# Patient Record
Sex: FEMALE | Race: WHITE | URBAN - METROPOLITAN AREA
[De-identification: names, ages, dates, MRNs, and addresses within clinical notes are randomized per-mention and may not be internally consistent; named-entity substitution may affect disease eponyms.]

---

## 2018-11-15 ENCOUNTER — OFFICE VISIT (OUTPATIENT)
Dept: NEUROLOGY | Age: 63
End: 2018-11-15

## 2018-11-15 VITALS
HEART RATE: 85 BPM | SYSTOLIC BLOOD PRESSURE: 158 MMHG | HEIGHT: 66 IN | DIASTOLIC BLOOD PRESSURE: 80 MMHG | OXYGEN SATURATION: 98 % | BODY MASS INDEX: 28.73 KG/M2 | WEIGHT: 178.8 LBS

## 2018-11-15 DIAGNOSIS — G60.9 IDIOPATHIC PERIPHERAL NEUROPATHY: Primary | ICD-10-CM

## 2018-11-15 DIAGNOSIS — R29.898 WEAKNESS OF BOTH ARMS: ICD-10-CM

## 2018-11-15 DIAGNOSIS — R53.83 FATIGUE, UNSPECIFIED TYPE: ICD-10-CM

## 2018-11-15 RX ORDER — ASPIRIN 81 MG/1
TABLET ORAL DAILY
COMMUNITY

## 2018-11-15 RX ORDER — TRIAMTERENE/HYDROCHLOROTHIAZID 37.5-25 MG
TABLET ORAL DAILY
COMMUNITY

## 2018-11-15 RX ORDER — NAPROXEN 500 MG/1
500 TABLET ORAL 2 TIMES DAILY WITH MEALS
COMMUNITY

## 2018-11-15 RX ORDER — TAMOXIFEN CITRATE 20 MG/1
20 TABLET ORAL DAILY
COMMUNITY

## 2018-11-15 NOTE — PROGRESS NOTES
NEUROLOGY CLINIC NOTE    Patient ID:  Amy Pyo  [id number]  14 y.o.  1955    Date of Consultation:  November 15, 2018    Referring Physician: Dr. Dane Gupta    Reason for Consultation:  Weakness and paresthesia    Chief Complaint   Patient presents with    New Patient     Pain & Weakness in shoulder       History of Present Illness: There are no active problems to display for this patient. Past Medical History:   Diagnosis Date    Breast cancer ([address]     Essential hypertension     Hyperlipidemia     Insomnia     Neurological disorder     Osteopenia     RLS (restless legs syndrome)     Vitamin D deficiency      Past Surgical History:   Procedure Laterality Date    HX GI      rectal surgery    HX MASTECTOMY Right     HX ORTHOPAEDIC      left knee surgery    HX UROLOGICAL      bladder surgery        Prior to Admission medications    Medication Sig Start Date End Date Taking? Authorizing Provider   triamterene-hydroCHLOROthiazide (MAXZIDE) 37.5-25 mg per tablet Take  by mouth daily. Yes Provider, Historical   zinc 50 mg tab tablet Take  by mouth daily. Yes Provider, Historical   tamoxifen (NOLVADEX) 20 mg tablet Take 20 mg by mouth daily. Yes Provider, Historical   aspirin delayed-release 81 mg tablet Take  by mouth daily. Yes Provider, Historical   naproxen (NAPROSYN) 500 mg tablet Take 500 mg by mouth two (2) times daily (with meals). Yes Provider, Historical   TURMERIC PO Take  by mouth. Yes Provider, Historical   multivit-min/iron/folic/lutein (CENTRUM SILVER WOMEN PO) Take  by mouth. Yes Provider, Historical   Lactobacillus rhamnosus GG (CULTURELLE PO) Take  by mouth. Yes Provider, Historical   calcium carbonate (CALCIUM 600 PO) Take  by mouth. Yes Provider, Historical   calcium carbonate/vitamin D3 (CALCIUM 600 + D,3, PO) Take  by mouth.     Provider, Historical     Allergies   Allergen Reactions    Other Medication Itching     TETANUS     Social History Tobacco Use    Smoking status: Former Smoker    Smokeless tobacco: Never Used   Substance Use Topics    Alcohol use: Yes     Alcohol/week: 3.0 oz     Types: 5 Cans of beer per week    Drug use: No      Family History   Problem Relation Age of Onset    Cancer Father     Asthma Mother        Subjective:      Patti Lamas is a 66 y.o. TJJL with history of right breast cancer s/p mastectomy and chemotherapy, migraine, RLS, hyperlipidemia, HTN, insomnia, vitamin D deficiency and osteopenia who was referred here by Dr. Marc Duverney for further evaluation of her neuropathy. History of breast cancer 2011 s/p mastectomy and 6 months of chemotherapy. Seen by Oncologist last 10/29/2018 for arms getting weak. Unable to hold her arms above her head for very long without fatigue. Stopped Gabapentin out of concern for side effects. Drops objects more easily. Worsening numbness and tingling. Mild neck pain. Referred here for further evaluation. Complains of widespread pain. Started in her feet (mainly toes and soles). Burning, tingling and numbness. Constant since the chemotherapy. 3/10 and at its worst 7/10. Problems going up and down stairs. She has to hold on but for the most part no issues. No weakness and loss of muscle bulk. Painful to walk due to feet pain. Gabapentin was too sedating. Takes Zinc supplement. Mentions some restlessness. Pain in shoulder and neck for years. Worse the past several months. Mainly lifting anything above the shoulder both at the onset and when sustaining it. Dropping things. Sometimes hands are numb upon waking up in the morning and feels swollen. No loss of muscle bulk. Labs done 10/18/2018 were unremarkable (CBC, CMP and vitamin D levels). Outside reports reviewed: office notes, lab reports.     Review of Systems:    A comprehensive review of systems was performed:   Constitutional: positive for fatigue   Eyes: positive for none  Ears, nose, mouth, throat, and face: positive for none  Respiratory: positive for none  Cardiovascular: positive for leg swelling   Gastrointestinal: positive for none  Genitourinary: positive for incontinence   Integument/breast: positive for none  Hematologic/lymphatic: positive for none  Musculoskeletal: positive for joint pain, myalgia, weakness  Neurological: positive for neuropathy   Behavioral/Psych: positive for none  Endocrine: positive for none  Allergic/Immunologic: positive for none      Objective:     Visit Vitals  /80 Comment: Patient states she has not taken BP meds. Pulse 85   Ht 5' 6\" (1.676 m)   Wt 178 lb 12.8 oz (81.1 kg)   SpO2 98%   BMI 28.86 kg/m²     4/10 generalized pain    PHYSICAL EXAM:    General Appearance: Alert, patient appears stated age. General:  Well developed, well nourished, patient in no apparent distress. Head/Face: The head is normocephalic and atraumatic. Eyes: Conjunctivae appear normal. Sclera appear normal and non-icteric. Nose (and Sinus):   No abnormality of the nose or sinuses is noted. Oral:   Throat is clear. Lymphatics:  No lymphadenopathy in the neck/head. Neck and Thyroid:   No bruits noted in the neck. Respiratory:  Lungs clear to auscultation. Cardiovascular:  Palpation and auscultation: regular rate and rhythm. Extremity: No joint swelling, erythema or pedal edema. NEUROLOGICAL EXAM:    Appearance: The patient is well developed, well nourished, provides a coherent history and is in no acute distress. Mental Status: Oriented to time, place and person. Fluent, no aphasia or dysarthria. Mood and affect appropriate. Cranial Nerves:   Intact visual fields. ALLISON, EOM's full, no nystagmus. Mild ptosis on 2 minutes extreme upgaze. Facial sensation is normal. Corneal reflexes are intact. Facial movement is symmetric. Hearing is normal bilaterally. Palate is midline with normal elevation. Sternocleidomastoid and trapezius muscles are normal. Tongue is midline.    Motor: 5/5 strength in upper and lower proximal and distal muscles. Normal bulk and tone. No fasciculations. No pronator drift. Reflexes:   Deep tendon reflexes 1+/4 and symmetrical. Downgoing toes. Sensory:   Normal to cold, pinprick and vibration. Gait:  Normal gait. No Romberg. Can do tandem walking. Tremor:   No tremor noted. Cerebellar:  Intact FTN/YAHAIRA/HTS. Neurovascular:  Normal heart sounds and regular rhythm, peripheral pulses intact, and no carotid bruits. Labs Reviewed      Assessment:   Peripheral neuropathy  Weakness of both arms  Fatigue    Plan:   Neurological examination does not reveal any clear glove and stocking sensory deficit typically seen in peripheral neuropathy. However given the patient's history of chemotherapy, she is likely having small fiber neuropathy which is typical.  Is mainly because of pain and discomfort but no issues associated with large fiber involvement which is ataxia and wobbliness. Check TSH and B12 and folate levels. Discussed with the patient potential treatment options to help with the neuropathic discomforts pending results of the testing. Exam does not reveal any actual muscle weakness. However issue with weakness plus paresthesia is concerning for emerging peripheral neuropathy or myopathy. EMG/NCS of the upper extremity was ordered. Further intervention will be done pending results of the testing. Exam reveals mild ptosis on extreme upgaze. Patient has symptoms of fatigability by history. Assess myasthenia panel laboratory workup. Again further intervention be done pending results of testing. All questions and concerns were answered. Visit lasted 60 minutes.   Greater than 50% was spent discussing her condition, potential etiology, prognosis, review of office records and laboratory workup, discussion about the need for further diagnostic testing, discussion about need for EMG/NCS to assess for peripheral neuropathy or myopathy, laboratory workup to assess for conditions that can cause neuropathy, testing for neuromuscular junction disorders. All questions and concerns were answered.

## 2018-11-15 NOTE — PATIENT INSTRUCTIONS
Office Policies  o Phone calls/patient messages:  Please allow up to 24 hours for someone in the office to contact you about your call or message. Be mindful your provider may be out of the office or your message may require further review. We encourage you to use [employer] for your messages as this is a faster, more efficient way to communicate with our office  o Medication Refills:  Prescription medications require up to 48 business hours to process. We encourage you to use [employer]rooms for your refills. For controlled medications: Please allow up to 72 business hours to process. Certain medications may require you to  a written prescription at our office. NO narcotic/controlled medications will be prescribed after 4pm Monday through Friday or on weekends  o Form/Paperwork Completion:  Please note there is a $25 fee for all paperwork completed by our providers. We ask that you allow 7-14 business days. Pre-payment is due prior to picking up/faxing the completed form. You may also download your forms to [employer]rooms to have your doctor print off. A Healthy Lifestyle: Care Instructions  Your Care Instructions    A healthy lifestyle can help you feel good, stay at a healthy weight, and have plenty of energy for both work and play. A healthy lifestyle is something you can share with your whole family. A healthy lifestyle also can lower your risk for serious health problems, such as high blood pressure, heart disease, and diabetes. You can follow a few steps listed below to improve your health and the health of your family. Follow-up care is a key part of your treatment and safety. Be sure to make and go to all appointments, and call your doctor if you are having problems. It's also a good idea to know your test results and keep a list of the medicines you take. How can you care for yourself at home? · Do not eat too much sugar, fat, or fast foods. You can still have dessert and treats now and then.  The goal is moderation. · Start small to improve your eating habits. Pay attention to portion sizes, drink less juice and soda pop, and eat more fruits and vegetables. ? Eat a healthy amount of food. A 3-ounce serving of meat, for example, is about the size of a deck of cards. Fill the rest of your plate with vegetables and whole grains. ? Limit the amount of soda and sports drinks you have every day. Drink more water when you are thirsty. ? Eat at least 5 servings of fruits and vegetables every day. It may seem like a lot, but it is not hard to reach this goal. A serving or helping is 1 piece of fruit, 1 cup of vegetables, or 2 cups of leafy, raw vegetables. Have an apple or some carrot sticks as an afternoon snack instead of a candy bar. Try to have fruits and/or vegetables at every meal.  · Make exercise part of your daily routine. You may want to start with simple activities, such as walking, bicycling, or slow swimming. Try to be active 30 to 60 minutes every day. You do not need to do all 30 to 60 minutes all at once. For example, you can exercise 3 times a day for 10 or 20 minutes. Moderate exercise is safe for most people, but it is always a good idea to talk to your doctor before starting an exercise program.  · Keep moving. D[place] the lawn, work in the garden, or [employer]. Take the stairs instead of the elevator at work. · If you smoke, quit. People who smoke have an increased risk for heart attack, stroke, cancer, and other lung illnesses. Quitting is hard, but there are ways to boost your chance of quitting tobacco for good. ? Use nicotine gum, patches, or lozenges. ? Ask your doctor about stop-smoking programs and medicines. ? Keep trying. In addition to reducing your risk of diseases in the future, you will notice some benefits soon after you stop using tobacco. If you have shortness of breath or asthma symptoms, they will likely get better within a few weeks after you quit.   · Limit how much alcohol you drink. Moderate amounts of alcohol (up to 2 drinks a day for men, 1 drink a day for women) are okay. But drinking too much can lead to liver problems, high blood pressure, and other health problems. Family health  If you have a family, there are many things you can do together to improve your health. · Eat meals together as a family as often as possible. · Eat healthy foods. This includes fruits, vegetables, lean meats and dairy, and whole grains. · Include your family in your fitness plan. Most people think of activities such as jogging or tennis as the way to fitness, but there are many ways you and your family can be more active. Anything that makes you breathe hard and gets your heart pumping is exercise. Here are some tips:  ? Walk to do errands or to take your child to school or the bus.  ? Go for a family bike ride after dinner instead of watching TV. Where can you learn more? Go to http://sugeyLiveOnDemandkeenan.info/. Enter W095 in the search box to learn more about \"A Healthy Lifestyle: Care Instructions. \"  Current as of: December 7, 2017  Content Version: 11.8  © 3571-9446 [employer]. Care instructions adapted under license by [employer] (which disclaims liability or warranty for this information). If you have questions about a medical condition or this instruction, always ask your healthcare professional. [address] any warranty or liability for your use of this information. Neuropathic Pain: Care Instructions  Your Care Instructions    Neuropathic pain is caused by pressure on or damage to your nerves. It's often simply called nerve pain. Some people feel this type of pain all the time. For others, it comes and goes. Diabetes, shingles, or an injury can cause nerve pain. Many people say the pain feels sharp, burning, or stabbing. But some people feel it as a dull ache.  In some cases, it makes your skin very sensitive. So touch, pressure, and other sensations that did not hurt before may now cause pain. It's important to know that this kind of pain is real and can affect your quality of life. It's also important to know that treatment can help. Treatment includes pain medicines, exercise, and physical therapy. Medicines can help reduce the number of pain signals that travel over the nerves. This can make the painful areas less sensitive. It can also help you sleep better and improve your mood. But medicines are only one part of successful treatment. Most people do best with more than one kind of treatment. Your doctor may recommend that you try cognitive-behavioral therapy and stress management. Or, if needed, you may decide to try to quit smoking, lower your blood pressure, or better control blood sugar. These kinds of healthy changes can also make a difference. If you feel that your treatment is not working, talk to your doctor. And be sure to tell your doctor if you think you might be depressed or anxious. These are common problems that can also be treated. Follow-up care is a key part of your treatment and safety. Be sure to make and go to all appointments, and call your doctor if you are having problems. It's also a good idea to know your test results and keep a list of the medicines you take. How can you care for yourself at home? · Be safe with medicines. Read and follow all instructions on the label. ? If the doctor gave you a prescription medicine for pain, take it as prescribed. ? If you are not taking a prescription pain medicine, ask your doctor if you can take an over-the-counter medicine. · Save hard tasks for days when you have less pain. Follow a hard task with an easy task. And remember to take breaks. · Relax, and reduce stress. You may want to try deep breathing or meditation. These can help. · Keep moving. Gentle, daily exercise can help reduce pain.  Your doctor or physical therapist can tell you what type of exercise is best for you. This may include walking, swimming, and stationary biking. It may also include stretches and range-of-motion exercises. · Try heat, cold packs, and massage. · Get enough sleep. Constant pain can make you more tired. If the pain makes it hard to sleep, talk with your doctor. · Think positively. Your thoughts can affect your pain. Do fun things to distract yourself from the pain. See a movie, read a book, listen to music, or spend time with a friend. · Keep a pain diary. Try to write down how strong your pain is and what it feels like. Also try to notice and write down how your moods, thoughts, sleep, activities, and medicine affect your pain. These notes can help you and your doctor find the best ways to treat your pain. Reducing constipation caused by pain medicine  Pain medicines often cause constipation. To reduce constipation:  · Include fruits, vegetables, beans, and whole grains in your diet each day. These foods are high in fiber. · Drink plenty of fluids, enough so that your urine is light yellow or clear like water. If you have kidney, heart, or liver disease and have to limit fluids, talk with your doctor before you increase the amount of fluids you drink. · Get some exercise every day. Build up slowly to 30 to 60 minutes a day on 5 or more days of the week. · Take a fiber supplement, such as Citrucel or Metamucil, every day if needed. Read and follow all instructions on the label. · Schedule time each day for a bowel movement. Having a daily routine may help. Take your time and do not strain when having a bowel movement. · Ask your doctor about a laxative. The goal is to have one easy bowel movement every 1 to 2 days. Do not let constipation go untreated for more than 3 days. When should you call for help? Call your doctor now or seek immediate medical care if:    · You feel sad, anxious, or hopeless for more than a few days.  This could mean you are depressed. Depression is common in people who have a lot of pain. But it can be treated.     · You have trouble with bowel movements, such as:  ? No bowel movement in 3 days. ? Blood in the anal area, in your stool, or on the toilet paper. ? Diarrhea for more than 24 hours.    Watch closely for changes in your health, and be sure to contact your doctor if:    · Your pain is getting worse.     · You can't sleep because of pain.     · You are very worried or anxious about your pain.     · You have trouble taking your pain medicine.     · You have any concerns about your pain medicine or its side effects.     · You have vomiting or cramps for more than 2 hours. Where can you learn more? Go to http://sugey-keenan.info/. Enter I894 in the search box to learn more about \"Neuropathic Pain: Care Instructions. \"  Current as of: June 4, 2018  Content Version: 11.8  © 9443-3040 [employer], [employer]. Care instructions adapted under license by [employer] (which disclaims liability or warranty for this information). If you have questions about a medical condition or this instruction, always ask your healthcare professional. [address] any warranty or liability for your use of this information.

## 2018-11-27 ENCOUNTER — OFFICE VISIT (OUTPATIENT)
Dept: NEUROLOGY | Age: 63
End: 2018-11-27

## 2018-11-27 VITALS
DIASTOLIC BLOOD PRESSURE: 89 MMHG | BODY MASS INDEX: 28.77 KG/M2 | HEIGHT: 66 IN | WEIGHT: 179 LBS | SYSTOLIC BLOOD PRESSURE: 151 MMHG | HEART RATE: 85 BPM | OXYGEN SATURATION: 98 %

## 2018-11-27 DIAGNOSIS — R29.898 WEAKNESS OF BOTH ARMS: Primary | ICD-10-CM

## 2018-11-27 DIAGNOSIS — R53.83 FATIGUE, UNSPECIFIED TYPE: ICD-10-CM

## 2018-11-27 NOTE — PATIENT INSTRUCTIONS
A Healthy Lifestyle: Care Instructions  Your Care Instructions    A healthy lifestyle can help you feel good, stay at a healthy weight, and have plenty of energy for both work and play. A healthy lifestyle is something you can share with your whole family. A healthy lifestyle also can lower your risk for serious health problems, such as high blood pressure, heart disease, and diabetes. You can follow a few steps listed below to improve your health and the health of your family. Follow-up care is a key part of your treatment and safety. Be sure to make and go to all appointments, and call your doctor if you are having problems. It's also a good idea to know your test results and keep a list of the medicines you take. How can you care for yourself at home? · Do not eat too much sugar, fat, or fast foods. You can still have dessert and treats now and then. The goal is moderation. · Start small to improve your eating habits. Pay attention to portion sizes, drink less juice and soda pop, and eat more fruits and vegetables. ? Eat a healthy amount of food. A 3-ounce serving of meat, for example, is about the size of a deck of cards. Fill the rest of your plate with vegetables and whole grains. ? Limit the amount of soda and sports drinks you have every day. Drink more water when you are thirsty. ? Eat at least 5 servings of fruits and vegetables every day. It may seem like a lot, but it is not hard to reach this goal. A serving or helping is 1 piece of fruit, 1 cup of vegetables, or 2 cups of leafy, raw vegetables. Have an apple or some carrot sticks as an afternoon snack instead of a candy bar. Try to have fruits and/or vegetables at every meal.  · Make exercise part of your daily routine. You may want to start with simple activities, such as walking, bicycling, or slow swimming. Try to be active 30 to 60 minutes every day. You do not need to do all 30 to 60 minutes all at once.  For example, you can exercise 3 times a day for 10 or 20 minutes. Moderate exercise is safe for most people, but it is always a good idea to talk to your doctor before starting an exercise program.  · Keep moving. Dao [place] the lawn, work in the garden, or [employer]. Take the stairs instead of the elevator at work. · If you smoke, quit. People who smoke have an increased risk for heart attack, stroke, cancer, and other lung illnesses. Quitting is hard, but there are ways to boost your chance of quitting tobacco for good. ? Use nicotine gum, patches, or lozenges. ? Ask your doctor about stop-smoking programs and medicines. ? Keep trying. In addition to reducing your risk of diseases in the future, you will notice some benefits soon after you stop using tobacco. If you have shortness of breath or asthma symptoms, they will likely get better within a few weeks after you quit. · Limit how much alcohol you drink. Moderate amounts of alcohol (up to 2 drinks a day for men, 1 drink a day for women) are okay. But drinking too much can lead to liver problems, high blood pressure, and other health problems. Family health  If you have a family, there are many things you can do together to improve your health. · Eat meals together as a family as often as possible. · Eat healthy foods. This includes fruits, vegetables, lean meats and dairy, and whole grains. · Include your family in your fitness plan. Most people think of activities such as jogging or tennis as the way to fitness, but there are many ways you and your family can be more active. Anything that makes you breathe hard and gets your heart pumping is exercise. Here are some tips:  ? Walk to do errands or to take your child to school or the bus.  ? Go for a family bike ride after dinner instead of watching TV. Where can you learn more? Go to http://sugey-keenan.info/. Enter S102 in the search box to learn more about \"A Healthy Lifestyle: Care Instructions. \"  Current as of: December 7, 2017  Content Version: 11.8  © 3430-9036 [employer]. Care instructions adapted under license by [employer] (which disclaims liability or warranty for this information). If you have questions about a medical condition or this instruction, always ask your healthcare professional. [address] any warranty or liability for your use of this information.

## 2018-12-05 NOTE — PROCEDURES
[facility] at [facility]        Tel: [phone] ? Fax: [phone]    ELECTRODIAGNOSTIC REPORT      Test Date:  2018    Patient: Meta Boeck : 1955 Physician: Karol Henriquez   ID#: [id number] SEX: Female Ref. Phys: Karol Henriquez     Patient History / Exam:  Proximal UE weakness and hand numbness. EMG & NCV Findings:  Sensory and motor nerve conduction studies (as indicated in the tables) were within reference of normal.    Disposable concentric needle EMG (as indicated in the following table) showed no evidence of electrical instability. EMG was not done on the RUE due to previous radical mastectomy. Impression: This study is normal. There is no electrodiagnostic evidence of an entrapment neuropathy, generalized large fiber neuropathy, myopathy or significant cervical radiculopathy at this time.       Karol Henriquez MD    Nerve Conduction Studies  Anti Sensory Summary Table     Stim Site NR Peak (ms) Norm Peak (ms) P-T Amp (µV) Norm P-T Amp Site1 Site2 Dist (cm)   Left Median Anti Sensory (2nd Digit)   Wrist    3.7 <4 29.8 >13 Wrist 2nd Digit 14.0   Right Median Anti Sensory (2nd Digit)   Wrist    3.6 <4 31.2 >13 Wrist 2nd Digit 14.0   Left Radial Anti Sensory (Base 1st Digit)   Wrist    2.1 <2.8 32.1 >11 Wrist Base 1st Digit 10.0   Right Radial Anti Sensory (Base 1st Digit)   Wrist    2.0 <2.8 24.1 >11 Wrist Base 1st Digit 10.0   Left Ulnar Anti Sensory (5th Digit)   Wrist    3.2 <4.0 38.7 >9 Wrist 5th Digit 14.0   Right Ulnar Anti Sensory (5th Digit)   Wrist    3.2 <4.0 24.9 >9 Wrist 5th Digit 14.0     Motor Summary Table     Stim Site NR Onset (ms) Norm Onset (ms) O-P Amp (mV) Norm O-P Amp P-T Amp (mV) Site1 Site2 Dist (cm) Juan (m/s)   Left Median Motor (Abd Poll Brev)   Wrist    3.8 <4.5 6.0 >4.1 9.3 Wrist Abd Poll Brev 8.0    Elbow    7.3  5.8  8.7 Elbow Wrist 21.0 60   Right Median Motor (Abd Poll Brev)   Wrist    4.1 <4.5 8.4 >4.1 12.7 Wrist Abd [place] Brev 8.0    Elbow    8.2  8.1  11.9 Elbow Wrist 22.0 54   Left Ulnar Motor (Abd Dig Minimi)   Wrist    2.3 <3.1 7.1 >7.0 11.3 Wrist Abd Dig Minimi 8.0    B Elbow    5.7  6.6  11.0 B Elbow Wrist 21.0 62   A Elbow    7.3  6.6  10.9 A Elbow B Elbow 10.0 63   Right Ulnar Motor (Abd Dig Minimi)   Wrist    2.3 <3.1 8.6 >7.0 13.1 Wrist Abd Dig Minimi 8.0    B Elbow    5.9  8.2  12.5 B Elbow Wrist 22.0 61   A Elbow    7.4  7.6  11.9 A Elbow B Elbow 10.0 67       EMG     Side Muscle Nerve Root Ins Act Fibs Psw Recrt Duration Amp Poly Comment   Left Deltoid Axillary C5-6 Nml Nml Nml Nml Nml Nml Nml    Left Triceps Radial C6-7-8 Nml Nml Nml Nml Nml Nml Nml    Left Biceps Musculocut C5-6 Nml Nml Nml Nml Nml Nml Nml    Left PronatorTeres Median C6-7 Nml Nml Nml Nml Nml Nml Nml    Left 1stDorInt Ulnar C8-T1 Nml Nml Nml Nml Nml Nml Nml    Left Lower Cerv Parasp Rami C7,T1 Nml Nml Nml Nml Nml Nml Nml    Left Mid Cerv Parasp Rami C5,6 Nml Nml Nml Nml Nml Nml Nml    Left Upper Cerv Parasp Rami C3,4 Nml Nml Nml Nml Nml Nml Nml      Waveforms:

## 2023-11-10 ENCOUNTER — APPOINTMENT (OUTPATIENT)
Facility: HOSPITAL | Age: 68
DRG: 418 | End: 2023-11-10
Payer: MEDICARE

## 2023-11-10 ENCOUNTER — ANESTHESIA (OUTPATIENT)
Facility: HOSPITAL | Age: 68
End: 2023-11-10
Payer: MEDICARE

## 2023-11-10 ENCOUNTER — HOSPITAL ENCOUNTER (INPATIENT)
Facility: HOSPITAL | Age: 68
LOS: 2 days | Discharge: HOME | DRG: 418 | End: 2023-11-12
Attending: EMERGENCY MEDICINE | Admitting: STUDENT IN AN ORGANIZED HEALTH CARE EDUCATION/TRAINING PROGRAM
Payer: MEDICARE

## 2023-11-10 ENCOUNTER — ANESTHESIA EVENT (OUTPATIENT)
Facility: HOSPITAL | Age: 68
End: 2023-11-10
Payer: MEDICARE

## 2023-11-10 DIAGNOSIS — K81.9 CHOLECYSTITIS: Primary | ICD-10-CM

## 2023-11-10 PROBLEM — K80.00 CALCULUS OF GALLBLADDER WITH ACUTE CHOLECYSTITIS WITHOUT OBSTRUCTION: Status: ACTIVE | Noted: 2023-11-10

## 2023-11-10 LAB
ALBUMIN SERPL-MCNC: 3.1 G/DL (ref 3.5–5)
ALBUMIN/GLOB SERPL: 0.8 (ref 1.1–2.2)
ALP SERPL-CCNC: 89 U/L (ref 45–117)
ALT SERPL-CCNC: 30 U/L (ref 12–78)
ANION GAP BLD CALC-SCNC: 13 (ref 10–20)
ANION GAP SERPL CALC-SCNC: 6 MMOL/L (ref 5–15)
APPEARANCE UR: CLEAR
AST SERPL-CCNC: 30 U/L (ref 15–37)
BACTERIA URNS QL MICRO: NEGATIVE /HPF
BASE EXCESS BLD CALC-SCNC: 2.5 MMOL/L
BASOPHILS # BLD: 0 K/UL (ref 0–0.1)
BASOPHILS NFR BLD: 0 % (ref 0–1)
BILIRUB DIRECT SERPL-MCNC: 0.2 MG/DL (ref 0–0.2)
BILIRUB SERPL-MCNC: 0.8 MG/DL (ref 0.2–1)
BILIRUB UR QL: NEGATIVE
BUN SERPL-MCNC: 9 MG/DL (ref 6–20)
BUN/CREAT SERPL: 12 (ref 12–20)
CA-I BLD-MCNC: 1.17 MMOL/L (ref 1.12–1.32)
CALCIUM SERPL-MCNC: 8.4 MG/DL (ref 8.5–10.1)
CHLORIDE BLD-SCNC: 99 MMOL/L (ref 100–108)
CHLORIDE SERPL-SCNC: 106 MMOL/L (ref 97–108)
CO2 BLD-SCNC: 28 MMOL/L (ref 19–24)
CO2 SERPL-SCNC: 27 MMOL/L (ref 21–32)
COLOR UR: ABNORMAL
CREAT SERPL-MCNC: 0.73 MG/DL (ref 0.55–1.02)
CREAT UR-MCNC: 0.5 MG/DL (ref 0.6–1.3)
DIFFERENTIAL METHOD BLD: ABNORMAL
EOSINOPHIL # BLD: 0.1 K/UL (ref 0–0.4)
EOSINOPHIL NFR BLD: 1 % (ref 0–7)
EPITH CASTS URNS QL MICRO: ABNORMAL /LPF
ERYTHROCYTE [DISTWIDTH] IN BLOOD BY AUTOMATED COUNT: 13.2 % (ref 11.5–14.5)
GLOBULIN SER CALC-MCNC: 3.7 G/DL (ref 2–4)
GLUCOSE BLD STRIP.AUTO-MCNC: 104 MG/DL (ref 74–106)
GLUCOSE SERPL-MCNC: 100 MG/DL (ref 65–100)
GLUCOSE UR STRIP.AUTO-MCNC: NEGATIVE MG/DL
GRAN CASTS URNS QL MICRO: ABNORMAL /LPF
HCO3 BLDA-SCNC: 28 MMOL/L
HCT VFR BLD AUTO: 38.3 % (ref 35–47)
HGB BLD-MCNC: 12.9 G/DL (ref 11.5–16)
HGB UR QL STRIP: NEGATIVE
HYALINE CASTS URNS QL MICRO: ABNORMAL /LPF (ref 0–5)
IMM GRANULOCYTES # BLD AUTO: 0.1 K/UL (ref 0–0.04)
IMM GRANULOCYTES NFR BLD AUTO: 1 % (ref 0–0.5)
KETONES UR QL STRIP.AUTO: ABNORMAL MG/DL
LEUKOCYTE ESTERASE UR QL STRIP.AUTO: ABNORMAL
LYMPHOCYTES # BLD: 1.6 K/UL (ref 0.8–3.5)
LYMPHOCYTES NFR BLD: 18 % (ref 12–49)
MCH RBC QN AUTO: 31.2 PG (ref 26–34)
MCHC RBC AUTO-ENTMCNC: 33.7 G/DL (ref 30–36.5)
MCV RBC AUTO: 92.7 FL (ref 80–99)
MONOCYTES # BLD: 1 K/UL (ref 0–1)
MONOCYTES NFR BLD: 10 % (ref 5–13)
NEUTS SEG # BLD: 6.5 K/UL (ref 1.8–8)
NEUTS SEG NFR BLD: 70 % (ref 32–75)
NITRITE UR QL STRIP.AUTO: NEGATIVE
NRBC # BLD: 0 K/UL (ref 0–0.01)
NRBC BLD-RTO: 0 PER 100 WBC
PCO2 BLDV: 46.3 MMHG (ref 41–51)
PH BLDV: 7.39 (ref 7.32–7.42)
PH UR STRIP: 6 (ref 5–8)
PLATELET # BLD AUTO: 257 K/UL (ref 150–400)
PMV BLD AUTO: 9.2 FL (ref 8.9–12.9)
PO2 BLDV: <27 MMHG (ref 25–40)
POTASSIUM BLD-SCNC: 3.2 MMOL/L (ref 3.5–5.5)
POTASSIUM SERPL-SCNC: 3.2 MMOL/L (ref 3.5–5.1)
PROT SERPL-MCNC: 6.8 G/DL (ref 6.4–8.2)
PROT UR STRIP-MCNC: NEGATIVE MG/DL
RBC # BLD AUTO: 4.13 M/UL (ref 3.8–5.2)
RBC #/AREA URNS HPF: ABNORMAL /HPF (ref 0–5)
SODIUM BLD-SCNC: 140 MMOL/L (ref 136–145)
SODIUM SERPL-SCNC: 139 MMOL/L (ref 136–145)
SP GR UR REFRACTOMETRY: 1.01
SPECIMEN SITE: ABNORMAL
UROBILINOGEN UR QL STRIP.AUTO: 1 EU/DL (ref 0.2–1)
WBC # BLD AUTO: 9.2 K/UL (ref 3.6–11)
WBC URNS QL MICRO: ABNORMAL /HPF (ref 0–4)

## 2023-11-10 PROCEDURE — 85025 COMPLETE CBC W/AUTO DIFF WBC: CPT

## 2023-11-10 PROCEDURE — 2720000010 HC SURG SUPPLY STERILE: Performed by: STUDENT IN AN ORGANIZED HEALTH CARE EDUCATION/TRAINING PROGRAM

## 2023-11-10 PROCEDURE — 2709999900 HC NON-CHARGEABLE SUPPLY: Performed by: STUDENT IN AN ORGANIZED HEALTH CARE EDUCATION/TRAINING PROGRAM

## 2023-11-10 PROCEDURE — 84132 ASSAY OF SERUM POTASSIUM: CPT

## 2023-11-10 PROCEDURE — 81001 URINALYSIS AUTO W/SCOPE: CPT

## 2023-11-10 PROCEDURE — 3600000004 HC SURGERY LEVEL 4 BASE: Performed by: STUDENT IN AN ORGANIZED HEALTH CARE EDUCATION/TRAINING PROGRAM

## 2023-11-10 PROCEDURE — 3700000001 HC ADD 15 MINUTES (ANESTHESIA): Performed by: STUDENT IN AN ORGANIZED HEALTH CARE EDUCATION/TRAINING PROGRAM

## 2023-11-10 PROCEDURE — 6360000002 HC RX W HCPCS: Performed by: STUDENT IN AN ORGANIZED HEALTH CARE EDUCATION/TRAINING PROGRAM

## 2023-11-10 PROCEDURE — 7100000000 HC PACU RECOVERY - FIRST 15 MIN: Performed by: STUDENT IN AN ORGANIZED HEALTH CARE EDUCATION/TRAINING PROGRAM

## 2023-11-10 PROCEDURE — 74176 CT ABD & PELVIS W/O CONTRAST: CPT

## 2023-11-10 PROCEDURE — 2500000003 HC RX 250 WO HCPCS: Performed by: NURSE ANESTHETIST, CERTIFIED REGISTERED

## 2023-11-10 PROCEDURE — 93005 ELECTROCARDIOGRAM TRACING: CPT

## 2023-11-10 PROCEDURE — 6360000002 HC RX W HCPCS: Performed by: NURSE ANESTHETIST, CERTIFIED REGISTERED

## 2023-11-10 PROCEDURE — 96374 THER/PROPH/DIAG INJ IV PUSH: CPT

## 2023-11-10 PROCEDURE — 82803 BLOOD GASES ANY COMBINATION: CPT

## 2023-11-10 PROCEDURE — 84295 ASSAY OF SERUM SODIUM: CPT

## 2023-11-10 PROCEDURE — 3600000014 HC SURGERY LEVEL 4 ADDTL 15MIN: Performed by: STUDENT IN AN ORGANIZED HEALTH CARE EDUCATION/TRAINING PROGRAM

## 2023-11-10 PROCEDURE — 80048 BASIC METABOLIC PNL TOTAL CA: CPT

## 2023-11-10 PROCEDURE — 76705 ECHO EXAM OF ABDOMEN: CPT

## 2023-11-10 PROCEDURE — 82330 ASSAY OF CALCIUM: CPT

## 2023-11-10 PROCEDURE — 88304 TISSUE EXAM BY PATHOLOGIST: CPT

## 2023-11-10 PROCEDURE — 7100000001 HC PACU RECOVERY - ADDTL 15 MIN: Performed by: STUDENT IN AN ORGANIZED HEALTH CARE EDUCATION/TRAINING PROGRAM

## 2023-11-10 PROCEDURE — 2580000003 HC RX 258: Performed by: NURSE ANESTHETIST, CERTIFIED REGISTERED

## 2023-11-10 PROCEDURE — 99221 1ST HOSP IP/OBS SF/LOW 40: CPT | Performed by: STUDENT IN AN ORGANIZED HEALTH CARE EDUCATION/TRAINING PROGRAM

## 2023-11-10 PROCEDURE — 0FT44ZZ RESECTION OF GALLBLADDER, PERCUTANEOUS ENDOSCOPIC APPROACH: ICD-10-PCS | Performed by: STUDENT IN AN ORGANIZED HEALTH CARE EDUCATION/TRAINING PROGRAM

## 2023-11-10 PROCEDURE — 2580000003 HC RX 258: Performed by: STUDENT IN AN ORGANIZED HEALTH CARE EDUCATION/TRAINING PROGRAM

## 2023-11-10 PROCEDURE — 36415 COLL VENOUS BLD VENIPUNCTURE: CPT

## 2023-11-10 PROCEDURE — C1758 CATHETER, URETERAL: HCPCS | Performed by: STUDENT IN AN ORGANIZED HEALTH CARE EDUCATION/TRAINING PROGRAM

## 2023-11-10 PROCEDURE — G0378 HOSPITAL OBSERVATION PER HR: HCPCS

## 2023-11-10 PROCEDURE — 3700000000 HC ANESTHESIA ATTENDED CARE: Performed by: STUDENT IN AN ORGANIZED HEALTH CARE EDUCATION/TRAINING PROGRAM

## 2023-11-10 PROCEDURE — 82947 ASSAY GLUCOSE BLOOD QUANT: CPT

## 2023-11-10 PROCEDURE — BF131ZZ FLUOROSCOPY OF GALLBLADDER AND BILE DUCTS USING LOW OSMOLAR CONTRAST: ICD-10-PCS | Performed by: STUDENT IN AN ORGANIZED HEALTH CARE EDUCATION/TRAINING PROGRAM

## 2023-11-10 PROCEDURE — 6360000004 HC RX CONTRAST MEDICATION: Performed by: STUDENT IN AN ORGANIZED HEALTH CARE EDUCATION/TRAINING PROGRAM

## 2023-11-10 PROCEDURE — 6360000002 HC RX W HCPCS: Performed by: ANESTHESIOLOGY

## 2023-11-10 PROCEDURE — 2500000003 HC RX 250 WO HCPCS: Performed by: ANESTHESIOLOGY

## 2023-11-10 PROCEDURE — 80076 HEPATIC FUNCTION PANEL: CPT

## 2023-11-10 PROCEDURE — 1100000000 HC RM PRIVATE

## 2023-11-10 PROCEDURE — 99285 EMERGENCY DEPT VISIT HI MDM: CPT

## 2023-11-10 PROCEDURE — 2580000003 HC RX 258: Performed by: EMERGENCY MEDICINE

## 2023-11-10 RX ORDER — SODIUM CHLORIDE 0.9 % (FLUSH) 0.9 %
5-40 SYRINGE (ML) INJECTION PRN
Status: DISCONTINUED | OUTPATIENT
Start: 2023-11-10 | End: 2023-11-11 | Stop reason: HOSPADM

## 2023-11-10 RX ORDER — HYDROCODONE BITARTRATE AND ACETAMINOPHEN 5; 325 MG/1; MG/1
1 TABLET ORAL EVERY 4 HOURS PRN
Qty: 30 TABLET | Refills: 0 | Status: SHIPPED | OUTPATIENT
Start: 2023-11-10 | End: 2023-11-15

## 2023-11-10 RX ORDER — SODIUM CHLORIDE 9 MG/ML
INJECTION, SOLUTION INTRAVENOUS PRN
Status: DISCONTINUED | OUTPATIENT
Start: 2023-11-10 | End: 2023-11-11 | Stop reason: HOSPADM

## 2023-11-10 RX ORDER — GLYCOPYRROLATE 0.2 MG/ML
INJECTION INTRAMUSCULAR; INTRAVENOUS PRN
Status: DISCONTINUED | OUTPATIENT
Start: 2023-11-10 | End: 2023-11-10 | Stop reason: SDUPTHER

## 2023-11-10 RX ORDER — HYDROMORPHONE HYDROCHLORIDE 1 MG/ML
0.25 INJECTION, SOLUTION INTRAMUSCULAR; INTRAVENOUS; SUBCUTANEOUS
Status: DISCONTINUED | OUTPATIENT
Start: 2023-11-10 | End: 2023-11-12 | Stop reason: HOSPADM

## 2023-11-10 RX ORDER — PHENYLEPHRINE HCL IN 0.9% NACL 0.4MG/10ML
SYRINGE (ML) INTRAVENOUS PRN
Status: DISCONTINUED | OUTPATIENT
Start: 2023-11-10 | End: 2023-11-10 | Stop reason: SDUPTHER

## 2023-11-10 RX ORDER — HYDROMORPHONE HYDROCHLORIDE 1 MG/ML
0.25 INJECTION, SOLUTION INTRAMUSCULAR; INTRAVENOUS; SUBCUTANEOUS EVERY 5 MIN PRN
Status: COMPLETED | OUTPATIENT
Start: 2023-11-10 | End: 2023-11-10

## 2023-11-10 RX ORDER — BUPIVACAINE HYDROCHLORIDE 5 MG/ML
INJECTION, SOLUTION EPIDURAL; INTRACAUDAL PRN
Status: DISCONTINUED | OUTPATIENT
Start: 2023-11-10 | End: 2023-11-10 | Stop reason: ALTCHOICE

## 2023-11-10 RX ORDER — SODIUM CHLORIDE 9 MG/ML
INJECTION, SOLUTION INTRAVENOUS PRN
Status: DISCONTINUED | OUTPATIENT
Start: 2023-11-10 | End: 2023-11-12 | Stop reason: HOSPADM

## 2023-11-10 RX ORDER — PROCHLORPERAZINE EDISYLATE 5 MG/ML
10 INJECTION INTRAMUSCULAR; INTRAVENOUS EVERY 6 HOURS PRN
Status: DISCONTINUED | OUTPATIENT
Start: 2023-11-10 | End: 2023-11-12 | Stop reason: HOSPADM

## 2023-11-10 RX ORDER — SODIUM CHLORIDE 9 MG/ML
INJECTION, SOLUTION INTRAVENOUS CONTINUOUS
Status: DISCONTINUED | OUTPATIENT
Start: 2023-11-10 | End: 2023-11-11

## 2023-11-10 RX ORDER — NEOSTIGMINE METHYLSULFATE 1 MG/ML
INJECTION, SOLUTION INTRAVENOUS PRN
Status: DISCONTINUED | OUTPATIENT
Start: 2023-11-10 | End: 2023-11-10 | Stop reason: SDUPTHER

## 2023-11-10 RX ORDER — MORPHINE SULFATE 4 MG/ML
4 INJECTION, SOLUTION INTRAMUSCULAR; INTRAVENOUS ONCE
Status: COMPLETED | OUTPATIENT
Start: 2023-11-10 | End: 2023-11-10

## 2023-11-10 RX ORDER — LIDOCAINE HYDROCHLORIDE 20 MG/ML
INJECTION, SOLUTION EPIDURAL; INFILTRATION; INTRACAUDAL; PERINEURAL PRN
Status: DISCONTINUED | OUTPATIENT
Start: 2023-11-10 | End: 2023-11-10 | Stop reason: SDUPTHER

## 2023-11-10 RX ORDER — 0.9 % SODIUM CHLORIDE 0.9 %
1000 INTRAVENOUS SOLUTION INTRAVENOUS ONCE
Status: COMPLETED | OUTPATIENT
Start: 2023-11-10 | End: 2023-11-10

## 2023-11-10 RX ORDER — FENTANYL CITRATE 50 UG/ML
50 INJECTION, SOLUTION INTRAMUSCULAR; INTRAVENOUS EVERY 5 MIN PRN
Status: COMPLETED | OUTPATIENT
Start: 2023-11-10 | End: 2023-11-11

## 2023-11-10 RX ORDER — ONDANSETRON 2 MG/ML
INJECTION INTRAMUSCULAR; INTRAVENOUS PRN
Status: DISCONTINUED | OUTPATIENT
Start: 2023-11-10 | End: 2023-11-10 | Stop reason: SDUPTHER

## 2023-11-10 RX ORDER — SODIUM CHLORIDE 0.9 % (FLUSH) 0.9 %
5-40 SYRINGE (ML) INJECTION PRN
Status: DISCONTINUED | OUTPATIENT
Start: 2023-11-10 | End: 2023-11-12 | Stop reason: HOSPADM

## 2023-11-10 RX ORDER — PROCHLORPERAZINE EDISYLATE 5 MG/ML
5 INJECTION INTRAMUSCULAR; INTRAVENOUS
Status: DISCONTINUED | OUTPATIENT
Start: 2023-11-10 | End: 2023-11-11 | Stop reason: HOSPADM

## 2023-11-10 RX ORDER — SODIUM CHLORIDE, SODIUM LACTATE, POTASSIUM CHLORIDE, CALCIUM CHLORIDE 600; 310; 30; 20 MG/100ML; MG/100ML; MG/100ML; MG/100ML
INJECTION, SOLUTION INTRAVENOUS CONTINUOUS PRN
Status: DISCONTINUED | OUTPATIENT
Start: 2023-11-10 | End: 2023-11-10 | Stop reason: SDUPTHER

## 2023-11-10 RX ORDER — DEXAMETHASONE SODIUM PHOSPHATE 4 MG/ML
INJECTION, SOLUTION INTRA-ARTICULAR; INTRALESIONAL; INTRAMUSCULAR; INTRAVENOUS; SOFT TISSUE PRN
Status: DISCONTINUED | OUTPATIENT
Start: 2023-11-10 | End: 2023-11-10 | Stop reason: SDUPTHER

## 2023-11-10 RX ORDER — CEFAZOLIN SODIUM 1 G/3ML
INJECTION, POWDER, FOR SOLUTION INTRAMUSCULAR; INTRAVENOUS PRN
Status: DISCONTINUED | OUTPATIENT
Start: 2023-11-10 | End: 2023-11-10 | Stop reason: SDUPTHER

## 2023-11-10 RX ORDER — SODIUM CHLORIDE 0.9 % (FLUSH) 0.9 %
5-40 SYRINGE (ML) INJECTION EVERY 12 HOURS SCHEDULED
Status: DISCONTINUED | OUTPATIENT
Start: 2023-11-10 | End: 2023-11-12 | Stop reason: HOSPADM

## 2023-11-10 RX ORDER — SODIUM CHLORIDE 0.9 % (FLUSH) 0.9 %
5-40 SYRINGE (ML) INJECTION EVERY 12 HOURS SCHEDULED
Status: DISCONTINUED | OUTPATIENT
Start: 2023-11-11 | End: 2023-11-11 | Stop reason: HOSPADM

## 2023-11-10 RX ORDER — HYDROMORPHONE HYDROCHLORIDE 1 MG/ML
0.5 INJECTION, SOLUTION INTRAMUSCULAR; INTRAVENOUS; SUBCUTANEOUS
Status: DISCONTINUED | OUTPATIENT
Start: 2023-11-10 | End: 2023-11-12 | Stop reason: HOSPADM

## 2023-11-10 RX ORDER — FENTANYL CITRATE 50 UG/ML
INJECTION, SOLUTION INTRAMUSCULAR; INTRAVENOUS PRN
Status: DISCONTINUED | OUTPATIENT
Start: 2023-11-10 | End: 2023-11-10 | Stop reason: SDUPTHER

## 2023-11-10 RX ORDER — ACETAMINOPHEN 325 MG/1
650 TABLET ORAL EVERY 6 HOURS
Status: DISCONTINUED | OUTPATIENT
Start: 2023-11-10 | End: 2023-11-12 | Stop reason: HOSPADM

## 2023-11-10 RX ORDER — POTASSIUM CHLORIDE 20 MEQ/1
40 TABLET, EXTENDED RELEASE ORAL PRN
Status: DISCONTINUED | OUTPATIENT
Start: 2023-11-10 | End: 2023-11-11

## 2023-11-10 RX ORDER — SUCCINYLCHOLINE CHLORIDE 20 MG/ML
INJECTION INTRAMUSCULAR; INTRAVENOUS PRN
Status: DISCONTINUED | OUTPATIENT
Start: 2023-11-10 | End: 2023-11-10 | Stop reason: SDUPTHER

## 2023-11-10 RX ORDER — MIDAZOLAM HYDROCHLORIDE 1 MG/ML
INJECTION INTRAMUSCULAR; INTRAVENOUS PRN
Status: DISCONTINUED | OUTPATIENT
Start: 2023-11-10 | End: 2023-11-10 | Stop reason: SDUPTHER

## 2023-11-10 RX ORDER — DEXMEDETOMIDINE HYDROCHLORIDE 100 UG/ML
INJECTION, SOLUTION INTRAVENOUS PRN
Status: DISCONTINUED | OUTPATIENT
Start: 2023-11-10 | End: 2023-11-10 | Stop reason: SDUPTHER

## 2023-11-10 RX ORDER — POTASSIUM CHLORIDE 7.45 MG/ML
10 INJECTION INTRAVENOUS PRN
Status: DISCONTINUED | OUTPATIENT
Start: 2023-11-10 | End: 2023-11-11

## 2023-11-10 RX ORDER — HYDROMORPHONE HYDROCHLORIDE 2 MG/ML
INJECTION, SOLUTION INTRAMUSCULAR; INTRAVENOUS; SUBCUTANEOUS PRN
Status: DISCONTINUED | OUTPATIENT
Start: 2023-11-10 | End: 2023-11-10 | Stop reason: SDUPTHER

## 2023-11-10 RX ORDER — ONDANSETRON 4 MG/1
4 TABLET, ORALLY DISINTEGRATING ORAL 3 TIMES DAILY PRN
Qty: 21 TABLET | Refills: 0 | Status: SHIPPED | OUTPATIENT
Start: 2023-11-10

## 2023-11-10 RX ORDER — MAGNESIUM SULFATE IN WATER 40 MG/ML
2000 INJECTION, SOLUTION INTRAVENOUS PRN
Status: DISCONTINUED | OUTPATIENT
Start: 2023-11-10 | End: 2023-11-12 | Stop reason: HOSPADM

## 2023-11-10 RX ORDER — ROCURONIUM BROMIDE 10 MG/ML
INJECTION, SOLUTION INTRAVENOUS PRN
Status: DISCONTINUED | OUTPATIENT
Start: 2023-11-10 | End: 2023-11-10 | Stop reason: SDUPTHER

## 2023-11-10 RX ADMIN — HYDROMORPHONE HYDROCHLORIDE 0.5 MG: 2 INJECTION INTRAMUSCULAR; INTRAVENOUS; SUBCUTANEOUS at 20:38

## 2023-11-10 RX ADMIN — MIDAZOLAM HYDROCHLORIDE 2 MG: 1 INJECTION, SOLUTION INTRAMUSCULAR; INTRAVENOUS at 20:25

## 2023-11-10 RX ADMIN — SODIUM CHLORIDE 1000 ML: 9 INJECTION, SOLUTION INTRAVENOUS at 13:31

## 2023-11-10 RX ADMIN — FENTANYL CITRATE 50 MCG: 50 INJECTION, SOLUTION INTRAMUSCULAR; INTRAVENOUS at 20:33

## 2023-11-10 RX ADMIN — PROPOFOL 150 MG: 10 INJECTION, EMULSION INTRAVENOUS at 20:36

## 2023-11-10 RX ADMIN — Medication 80 MCG: at 20:41

## 2023-11-10 RX ADMIN — HYDROMORPHONE HYDROCHLORIDE 0.25 MG: 1 INJECTION, SOLUTION INTRAMUSCULAR; INTRAVENOUS; SUBCUTANEOUS at 23:40

## 2023-11-10 RX ADMIN — FENTANYL CITRATE 50 MCG: 50 INJECTION INTRAMUSCULAR; INTRAVENOUS at 23:57

## 2023-11-10 RX ADMIN — DEXMEDETOMIDINE HYDROCHLORIDE 10 MCG: 100 INJECTION, SOLUTION, CONCENTRATE INTRAVENOUS at 21:29

## 2023-11-10 RX ADMIN — Medication 80 MCG: at 20:53

## 2023-11-10 RX ADMIN — GLYCOPYRROLATE 0.4 MG: 0.2 INJECTION INTRAMUSCULAR; INTRAVENOUS at 22:55

## 2023-11-10 RX ADMIN — ROCURONIUM BROMIDE 10 MG: 10 INJECTION INTRAVENOUS at 20:36

## 2023-11-10 RX ADMIN — ROCURONIUM BROMIDE 20 MG: 10 INJECTION INTRAVENOUS at 20:40

## 2023-11-10 RX ADMIN — LIDOCAINE HYDROCHLORIDE 100 MG: 20 INJECTION, SOLUTION EPIDURAL; INFILTRATION; INTRACAUDAL; PERINEURAL at 20:33

## 2023-11-10 RX ADMIN — ONDANSETRON 4 MG: 2 INJECTION INTRAMUSCULAR; INTRAVENOUS at 23:00

## 2023-11-10 RX ADMIN — FENTANYL CITRATE 50 MCG: 50 INJECTION, SOLUTION INTRAMUSCULAR; INTRAVENOUS at 21:16

## 2023-11-10 RX ADMIN — CEFAZOLIN 2 G: 1 INJECTION, POWDER, FOR SOLUTION INTRAMUSCULAR; INTRAVENOUS at 20:38

## 2023-11-10 RX ADMIN — HYDROMORPHONE HYDROCHLORIDE 0.25 MG: 1 INJECTION, SOLUTION INTRAMUSCULAR; INTRAVENOUS; SUBCUTANEOUS at 23:35

## 2023-11-10 RX ADMIN — SODIUM CHLORIDE: 9 INJECTION, SOLUTION INTRAVENOUS at 19:55

## 2023-11-10 RX ADMIN — MORPHINE SULFATE 4 MG: 4 INJECTION, SOLUTION INTRAMUSCULAR; INTRAVENOUS at 18:33

## 2023-11-10 RX ADMIN — SODIUM CHLORIDE, POTASSIUM CHLORIDE, SODIUM LACTATE AND CALCIUM CHLORIDE: 600; 310; 30; 20 INJECTION, SOLUTION INTRAVENOUS at 21:57

## 2023-11-10 RX ADMIN — SUCCINYLCHOLINE CHLORIDE 140 MG: 20 INJECTION, SOLUTION INTRAMUSCULAR; INTRAVENOUS at 20:36

## 2023-11-10 RX ADMIN — Medication 4 MG: at 22:55

## 2023-11-10 RX ADMIN — PHENYLEPHRINE HYDROCHLORIDE 40 MCG/MIN: 10 INJECTION INTRAVENOUS at 20:54

## 2023-11-10 RX ADMIN — DEXAMETHASONE SODIUM PHOSPHATE 8 MG: 4 INJECTION INTRA-ARTICULAR; INTRALESIONAL; INTRAMUSCULAR; INTRAVENOUS; SOFT TISSUE at 20:40

## 2023-11-10 RX ADMIN — PROPOFOL 50 MG: 10 INJECTION, EMULSION INTRAVENOUS at 21:53

## 2023-11-10 RX ADMIN — SODIUM CHLORIDE, POTASSIUM CHLORIDE, SODIUM LACTATE AND CALCIUM CHLORIDE: 600; 310; 30; 20 INJECTION, SOLUTION INTRAVENOUS at 20:26

## 2023-11-10 RX ADMIN — GLYCOPYRROLATE 0.2 MG: 0.2 INJECTION INTRAMUSCULAR; INTRAVENOUS at 21:04

## 2023-11-10 RX ADMIN — ROCURONIUM BROMIDE 10 MG: 10 INJECTION INTRAVENOUS at 21:39

## 2023-11-10 RX ADMIN — SODIUM CHLORIDE: 9 INJECTION, SOLUTION INTRAVENOUS at 23:21

## 2023-11-10 ASSESSMENT — PAIN SCALES - GENERAL
PAINLEVEL_OUTOF10: 6
PAINLEVEL_OUTOF10: 5
PAINLEVEL_OUTOF10: 8
PAINLEVEL_OUTOF10: 3

## 2023-11-10 ASSESSMENT — PAIN DESCRIPTION - ORIENTATION
ORIENTATION: RIGHT

## 2023-11-10 ASSESSMENT — LIFESTYLE VARIABLES
HOW MANY STANDARD DRINKS CONTAINING ALCOHOL DO YOU HAVE ON A TYPICAL DAY: PATIENT DOES NOT DRINK
HOW OFTEN DO YOU HAVE A DRINK CONTAINING ALCOHOL: NEVER

## 2023-11-10 ASSESSMENT — PAIN DESCRIPTION - DESCRIPTORS
DESCRIPTORS: ACHING
DESCRIPTORS: PATIENT UNABLE TO DESCRIBE
DESCRIPTORS: PATIENT UNABLE TO DESCRIBE
DESCRIPTORS: ACHING
DESCRIPTORS: ACHING

## 2023-11-10 ASSESSMENT — PAIN - FUNCTIONAL ASSESSMENT: PAIN_FUNCTIONAL_ASSESSMENT: 0-10

## 2023-11-10 ASSESSMENT — PAIN DESCRIPTION - LOCATION
LOCATION: ABDOMEN

## 2023-11-10 ASSESSMENT — PAIN DESCRIPTION - PAIN TYPE
TYPE: ACUTE PAIN
TYPE: ACUTE PAIN

## 2023-11-10 NOTE — DISCHARGE INSTRUCTIONS
Cholecystectomy: What to Expect at [address]  After your surgery, it is normal to feel weak and tired for several days after you return home. Your belly may be swollen. If you had laparoscopic surgery, you may also have pain in your shoulder for about 24 hours. You may have gas or need to burp a lot at first, and a few people get diarrhea. The diarrhea usually goes away in 2 to 4 weeks, but it may last longer. How quickly you recover depends on whether you had a laparoscopic or open surgery. For a laparoscopic surgery, most people can go back to work or their normal routine in 1 to 2 weeks, but it may take longer, depending on the type of work you do. For an open surgery, it will probably take 4 to 6 weeks before you get back to your normal routine. This care sheet gives you a general idea about how long it will take for you to recover. However, each person recovers at a different pace. Follow the steps below to get better as quickly as possible. How can you care for yourself at home? Activity  Rest when you feel tired. Getting enough sleep will help you recover. Try to walk each day. Start out by walking a little more than you did the day before. Gradually increase the amount you walk. Walking boosts blood flow and helps prevent pneumonia and constipation. For about 2 to 4 weeks, avoid lifting anything that would make you strain. This may include a child, heavy grocery bags and milk containers, a heavy briefcase or backpack, cat litter or dog food bags, or a vacuum . Avoid strenuous activities, such as biking, jogging, weightlifting, and aerobic exercise, until your doctor says it is okay. You may shower 24 to 48 hours after surgery, if your doctor okays it. Pat the cut (incision) dry. Do not take a bath for the first 2 weeks, or until your doctor tells you it is okay.   You may drive when you are no longer taking pain medicine and can quickly move your foot from the gas pedal to the

## 2023-11-10 NOTE — ED PROVIDER NOTES
[facility] EMERGENCY DEPT  EMERGENCY DEPARTMENT HISTORY AND PHYSICAL EXAM      Date: 11/10/2023  Patient Name: Taiwo Monaco  MRN: [id number]  [address] [facility] 1955  Date of evaluation: 11/10/2023  Provider: Angie Mendoza MD   Note Started: 11:39 AM EST 11/10/23    HISTORY OF PRESENT ILLNESS     Chief Complaint   Patient presents with    Flank Pain     R flank pain that started over the past few days. Patient states she has been constipated and took miralax and had a normal BM yesterday       History Provided By: Patient    HPI: Taiwo Monaco is a 79 y.o. female who complains of some right flank pain that started a few days ago. She says that she had dealt with some constipation but took MiraLAX and had a normal bowel movement yesterday and felt like it might be resolved. She denies any fever, chills, nausea, vomiting, chest pain, shortness of breath, rash, diarrhea, headache, night sweats, illness. Symptoms are constant with no exacerbating or relieving factors but they do have episodes of flare that are not predictable. She has not treated with anything and Tylenol with only mild relief of her symptoms. PAST MEDICAL HISTORY   Past Medical History:  No past medical history on file. Past Surgical History:  No past surgical history on file. Family History:  No family history on file. Social History: Allergies: Allergies   Allergen Reactions    Penicillins Hives    Tetanus Toxoids Hives       PCP: Katheryne Mcburney, MD    Current Meds:   Current Facility-Administered Medications   Medication Dose Route Frequency Provider Last Rate Last Admin    sodium chloride 0.9 % bolus 1,000 mL  1,000 mL IntraVENous Once Mary Ivey .9 mL/hr at 11/10/23 1331 1,000 mL at 11/10/23 1331     Current Outpatient Medications   Medication Sig Dispense Refill    HYDROcodone-acetaminophen (LORCET) 5-325 MG per tablet Take 1 tablet by mouth every 4 hours as needed for Pain for up to 5 days.  Intended are inadvertently transcribed by the computer software. Please disregards these errors.  Please excuse any errors that have escaped final proofreading.)     Julio César Chavez MD  11/10/23 6675

## 2023-11-11 LAB
ANION GAP SERPL CALC-SCNC: 6 MMOL/L (ref 5–15)
BASOPHILS # BLD: 0 K/UL (ref 0–0.1)
BASOPHILS NFR BLD: 0 % (ref 0–1)
BUN SERPL-MCNC: 10 MG/DL (ref 6–20)
BUN/CREAT SERPL: 13 (ref 12–20)
CALCIUM SERPL-MCNC: 8.3 MG/DL (ref 8.5–10.1)
CHLORIDE SERPL-SCNC: 107 MMOL/L (ref 97–108)
CO2 SERPL-SCNC: 26 MMOL/L (ref 21–32)
CREAT SERPL-MCNC: 0.77 MG/DL (ref 0.55–1.02)
DIFFERENTIAL METHOD BLD: ABNORMAL
EOSINOPHIL # BLD: 0 K/UL (ref 0–0.4)
EOSINOPHIL NFR BLD: 0 % (ref 0–7)
ERYTHROCYTE [DISTWIDTH] IN BLOOD BY AUTOMATED COUNT: 13.2 % (ref 11.5–14.5)
GLUCOSE SERPL-MCNC: 140 MG/DL (ref 65–100)
HCT VFR BLD AUTO: 38.6 % (ref 35–47)
HGB BLD-MCNC: 12.6 G/DL (ref 11.5–16)
IMM GRANULOCYTES # BLD AUTO: 0.1 K/UL (ref 0–0.04)
IMM GRANULOCYTES NFR BLD AUTO: 1 % (ref 0–0.5)
LYMPHOCYTES # BLD: 0.7 K/UL (ref 0.8–3.5)
LYMPHOCYTES NFR BLD: 9 % (ref 12–49)
MCH RBC QN AUTO: 31.1 PG (ref 26–34)
MCHC RBC AUTO-ENTMCNC: 32.6 G/DL (ref 30–36.5)
MCV RBC AUTO: 95.3 FL (ref 80–99)
MONOCYTES # BLD: 0.5 K/UL (ref 0–1)
MONOCYTES NFR BLD: 6 % (ref 5–13)
NEUTS SEG # BLD: 6.6 K/UL (ref 1.8–8)
NEUTS SEG NFR BLD: 84 % (ref 32–75)
NRBC # BLD: 0 K/UL (ref 0–0.01)
NRBC BLD-RTO: 0 PER 100 WBC
PLATELET # BLD AUTO: 226 K/UL (ref 150–400)
PMV BLD AUTO: 9.5 FL (ref 8.9–12.9)
POTASSIUM SERPL-SCNC: 3.9 MMOL/L (ref 3.5–5.1)
RBC # BLD AUTO: 4.05 M/UL (ref 3.8–5.2)
RBC MORPH BLD: ABNORMAL
SODIUM SERPL-SCNC: 139 MMOL/L (ref 136–145)
WBC # BLD AUTO: 7.9 K/UL (ref 3.6–11)

## 2023-11-11 PROCEDURE — 36415 COLL VENOUS BLD VENIPUNCTURE: CPT

## 2023-11-11 PROCEDURE — 2500000003 HC RX 250 WO HCPCS: Performed by: STUDENT IN AN ORGANIZED HEALTH CARE EDUCATION/TRAINING PROGRAM

## 2023-11-11 PROCEDURE — 85025 COMPLETE CBC W/AUTO DIFF WBC: CPT

## 2023-11-11 PROCEDURE — 51701 INSERT BLADDER CATHETER: CPT

## 2023-11-11 PROCEDURE — 80048 BASIC METABOLIC PNL TOTAL CA: CPT

## 2023-11-11 PROCEDURE — 6360000002 HC RX W HCPCS: Performed by: ANESTHESIOLOGY

## 2023-11-11 PROCEDURE — 6370000000 HC RX 637 (ALT 250 FOR IP): Performed by: STUDENT IN AN ORGANIZED HEALTH CARE EDUCATION/TRAINING PROGRAM

## 2023-11-11 PROCEDURE — 1100000000 HC RM PRIVATE

## 2023-11-11 PROCEDURE — G0378 HOSPITAL OBSERVATION PER HR: HCPCS

## 2023-11-11 PROCEDURE — 96361 HYDRATE IV INFUSION ADD-ON: CPT

## 2023-11-11 PROCEDURE — 6370000000 HC RX 637 (ALT 250 FOR IP): Performed by: SURGERY

## 2023-11-11 PROCEDURE — 2580000003 HC RX 258: Performed by: STUDENT IN AN ORGANIZED HEALTH CARE EDUCATION/TRAINING PROGRAM

## 2023-11-11 PROCEDURE — 51798 US URINE CAPACITY MEASURE: CPT

## 2023-11-11 PROCEDURE — 96375 TX/PRO/DX INJ NEW DRUG ADDON: CPT

## 2023-11-11 RX ORDER — LANOLIN ALCOHOL/MO/W.PET/CERES
1000 CREAM (GRAM) TOPICAL NIGHTLY
COMMUNITY

## 2023-11-11 RX ORDER — HYDROCODONE BITARTRATE AND ACETAMINOPHEN 5; 325 MG/1; MG/1
1 TABLET ORAL EVERY 4 HOURS PRN
Status: DISCONTINUED | OUTPATIENT
Start: 2023-11-11 | End: 2023-11-12 | Stop reason: HOSPADM

## 2023-11-11 RX ORDER — PHENOL 1.4 %
1 AEROSOL, SPRAY (ML) MUCOUS MEMBRANE 2 TIMES DAILY
COMMUNITY

## 2023-11-11 RX ORDER — TRIAMTERENE AND HYDROCHLOROTHIAZIDE 37.5; 25 MG/1; MG/1
TABLET ORAL
COMMUNITY
Start: 2023-09-29

## 2023-11-11 RX ORDER — OXYCODONE HYDROCHLORIDE 5 MG/1
5 TABLET ORAL EVERY 4 HOURS PRN
Status: DISCONTINUED | OUTPATIENT
Start: 2023-11-11 | End: 2023-11-11

## 2023-11-11 RX ORDER — MIRABEGRON 50 MG/1
TABLET, FILM COATED, EXTENDED RELEASE ORAL
COMMUNITY
Start: 2023-10-05

## 2023-11-11 RX ORDER — ASCORBIC ACID 500 MG
1000 TABLET ORAL DAILY
COMMUNITY

## 2023-11-11 RX ORDER — MULTIVITAMIN WITH IRON
100 TABLET ORAL DAILY
COMMUNITY

## 2023-11-11 RX ORDER — ZINC GLUCONATE 50 MG
50 TABLET ORAL DAILY
COMMUNITY

## 2023-11-11 RX ADMIN — ACETAMINOPHEN 650 MG: 325 TABLET ORAL at 09:05

## 2023-11-11 RX ADMIN — ACETAMINOPHEN 650 MG: 325 TABLET ORAL at 13:56

## 2023-11-11 RX ADMIN — SODIUM CHLORIDE, PRESERVATIVE FREE 10 ML: 5 INJECTION INTRAVENOUS at 20:22

## 2023-11-11 RX ADMIN — ACETAMINOPHEN 650 MG: 325 TABLET ORAL at 00:56

## 2023-11-11 RX ADMIN — FENTANYL CITRATE 50 MCG: 50 INJECTION INTRAMUSCULAR; INTRAVENOUS at 00:02

## 2023-11-11 RX ADMIN — ACETAMINOPHEN 650 MG: 325 TABLET ORAL at 20:11

## 2023-11-11 RX ADMIN — HYDROCODONE BITARTRATE AND ACETAMINOPHEN 1 TABLET: 5; 325 TABLET ORAL at 17:58

## 2023-11-11 RX ADMIN — SODIUM CHLORIDE, PRESERVATIVE FREE 10 ML: 5 INJECTION INTRAVENOUS at 09:05

## 2023-11-11 RX ADMIN — HYDROMORPHONE HYDROCHLORIDE 0.5 MG: 1 INJECTION, SOLUTION INTRAMUSCULAR; INTRAVENOUS; SUBCUTANEOUS at 04:26

## 2023-11-11 RX ADMIN — HYDROCODONE BITARTRATE AND ACETAMINOPHEN 1 TABLET: 5; 325 TABLET ORAL at 12:03

## 2023-11-11 ASSESSMENT — PAIN SCALES - GENERAL
PAINLEVEL_OUTOF10: 2
PAINLEVEL_OUTOF10: 7
PAINLEVEL_OUTOF10: 5
PAINLEVEL_OUTOF10: 4
PAINLEVEL_OUTOF10: 3
PAINLEVEL_OUTOF10: 4
PAINLEVEL_OUTOF10: 2
PAINLEVEL_OUTOF10: 2
PAINLEVEL_OUTOF10: 7

## 2023-11-11 ASSESSMENT — PAIN DESCRIPTION - ORIENTATION
ORIENTATION: RIGHT
ORIENTATION: RIGHT;ANTERIOR
ORIENTATION: RIGHT

## 2023-11-11 ASSESSMENT — PAIN DESCRIPTION - LOCATION
LOCATION: ABDOMEN

## 2023-11-11 ASSESSMENT — PAIN DESCRIPTION - PAIN TYPE: TYPE: SURGICAL PAIN

## 2023-11-11 ASSESSMENT — PAIN DESCRIPTION - DESCRIPTORS
DESCRIPTORS: ACHING
DESCRIPTORS: SORE
DESCRIPTORS: ACHING;SORE
DESCRIPTORS: ACHING
DESCRIPTORS: DULL

## 2023-11-11 ASSESSMENT — PAIN - FUNCTIONAL ASSESSMENT
PAIN_FUNCTIONAL_ASSESSMENT: ACTIVITIES ARE NOT PREVENTED
PAIN_FUNCTIONAL_ASSESSMENT: PREVENTS OR INTERFERES SOME ACTIVE ACTIVITIES AND ADLS
PAIN_FUNCTIONAL_ASSESSMENT: ACTIVITIES ARE NOT PREVENTED
PAIN_FUNCTIONAL_ASSESSMENT: PREVENTS OR INTERFERES SOME ACTIVE ACTIVITIES AND ADLS
PAIN_FUNCTIONAL_ASSESSMENT: PREVENTS OR INTERFERES SOME ACTIVE ACTIVITIES AND ADLS
PAIN_FUNCTIONAL_ASSESSMENT: ACTIVITIES ARE NOT PREVENTED

## 2023-11-11 ASSESSMENT — PAIN SCALES - WONG BAKER
WONGBAKER_NUMERICALRESPONSE: 0
WONGBAKER_NUMERICALRESPONSE: 2
WONGBAKER_NUMERICALRESPONSE: 0

## 2023-11-11 NOTE — PROGRESS NOTES
End of Shift Note    Bedside shift change report given to Flower Licea RN (oncoming nurse) by Peyton Smiley RN (offgoing nurse). Report included the following information SBAR, OR Summary, MAR, and Recent Results    Shift worked:  7a to 7p     Shift summary and any significant changes: Eduardo removed at 0915 today, has not voided yet, bladder scanned at 1715 for 172 ml. Dr. Tomlin Gift aware, will give her more time and encourage ambulation . Check bladder scan again around 1930 . Alden Agreste Diet advanced to regular low fat, Using Hydrocodone for pain,. Probable discharge tomorrow. Concerns for physician to address:       Zone phone for oncoming shift:   [phone]       Activity:     Number times ambulated in hallways past shift: 2  Number of times OOB to chair past shift: 3    Cardiac:   Cardiac Monitoring: No           Access:  Current line(s): PIV     Genitourinary:   Urinary status: voiding    Respiratory:      Chronic home O2 use?: NO  Incentive spirometer at bedside: YES       GI:     Current diet:  ADULT DIET;  Regular; Low Fat (less than or equal to 50 gm/day)  Passing flatus: YES  Tolerating current diet: YES       Pain Management:   Patient states pain is manageable on current regimen: YES    Skin:     Interventions: increase time out of bed    Patient Safety:  Fall Score:    Interventions: gripper socks and pt to call before getting OOB       Length of Stay:  Expected LOS: 1  Actual LOS: [address]

## 2023-11-11 NOTE — OP NOTE
Operative Note      Patient: Delmi Jesus  YOB: 1955  MRN: [id number]    Date of Procedure: 11/10/2023    Pre-Op Diagnosis Codes:     * Acute cholecystitis [K81.0]    Post-Op Diagnosis:  Acute on chronic cholecystitis        Procedure(s):  CHOLECYSTECTOMY LAPAROSCOPIC CHOLANGIOGRAM    Surgeon(s):  Nadja Vang MD    Assistant:   * No surgical staff found *    Anesthesia: General    Estimated Blood Loss (mL): less than 455     Complications: None    Specimens:   ID Type Source Tests Collected by Time Destination   1 : gallbladder Tissue Gallbladder SURGICAL PATHOLOGY Nadja Vang MD 11/10/2023 2229        Implants:  * No implants in log *      Drains:   Closed/Suction Drain Right; Anterior Abdomen Bulb (Active)       Urinary Catheter 11/10/23 2 Way; Eduardo (Active)       [REMOVED] NG/OG/NJ/NE Tube Orogastric 18 fr Center mouth (Removed)       Findings: Gallbladder hydrops with pus      Detailed Description of Procedure: The patient was brought back to the operating room and placed in the supine position. General endotracheal anesthesia was induced. A Eduardo was placed. The abdomen was prepped and draped in the usual sterile fashion. After timeout, a 1.5 cm supraumbilical incision was made. Dissection was carried down to the fascia, which was elevated with a skin hook. The peritoneum was accessed with a Veress needle and insufflated to 15 mmHg using CO2. A 12 mm port was placed and inspection of the entry site was pristine without any evidence of injury. Attention was turned to the right upper quadrant and 3 additional 5 mm trocars were placed, 1 in the subxiphoid position, and 2 along the right subcostal margin. The gallbladder was noted to be markedly inflamed and distended beyond where it could be grasped. The gallbladder was decompressed of approximately 50 mL of hydrops using an aspirator needle. Near the end of aspiration pus was aspirated.   Next, we turned our attention towards the dissection. Working in the triangle of Calot, the peritoneum was opened both medially and laterally. The lower one third of the gallbladder was dissected off the hepatocystic plate. Dissection was completed to achieve a critical view. Two and only two structures could be seen entering and exiting the gallbladder with the liver visible posteriorly. Next, we turned our attention towards performing the cholangiogram.  A small ductotomy was made in the cystic duct. An Aleman clamp was inserted through the central trocar and the cystic duct cannulated with a 5 Cymro catheter. The Aleman clamp was occluded and flushing with saline was smooth without any evidence of leak. The C arm was brought onto the field and a cholangiogram performed. It demonstrated good flow into the duodenum without any filling defects. It also demonstrated normal anatomy confirming cannulation of the cystic duct and flow cephalad into the common hepatic duct. The bifurcation was occluded by our grasper positioning the gallbladder and compressing the proximal common hepatic duct prior to the bifurcation. However, it was clear that we were cannulated well into the cystic duct very far away from its insertion into the common bile duct and there was clearly common hepatic duct proximal to this traveling for some length. The cholecystectomy was completed by first dividing the cystic artery. This was doubly clipped on the patient side and singularly clipped on the specimen side. The cystic duct was controlled with 2 separate Endoloops on the patient's side and one Endoloop on the gallbladder side. The gallbladder was elevated off the hepatocystic plate using electrocautery. The gallbladder was placed in an Endo Catch bag and retrieved via the the supraumbilical incision. Hemostasis was achieved along the hepatocystic plate with electrocautery.   A 19 Cymro round Lemuel drain was positioned in the gallbladder fossa

## 2023-11-11 NOTE — FLOWSHEET NOTE
11/10/23 2316   Handoff   Communication Given Transfer Handoff   Handoff phase Phase I receiving   Handoff Given To Nickolas Coombs RN   Handoff Received From ROSE Coleman RN and Khalida Poe Face to Face; At bedside        11/10/23 0031   Handoff   Communication Given Transfer Handoff   Handoff phase Phase I transferring   Handoff Given To [employer] RN   Handoff Received From Nickolas Coombs RN   Handoff Communication Telephone   Time Handoff Given 9661

## 2023-11-11 NOTE — ANESTHESIA POSTPROCEDURE EVALUATION
Department of Anesthesiology  Postprocedure Note    Patient: Joshua Ware  MRN: [id number]  [facility] 1955  Date of evaluation: 11/11/2023      Procedure Summary     Date: 11/10/23 Room / Location: [facility] MAIN OR  / [facility] MAIN OR    Anesthesia Start: 2025 Anesthesia Stop: 2311    Procedure: CHOLECYSTECTOMY LAPAROSCOPIC CHOLANGIOGRAM (Abdomen) Diagnosis:       Acute cholecystitis      (Acute cholecystitis [K81.0])    Providers: Baldemar Bolton MD Responsible Provider: Jonny Muniz MD    Anesthesia Type: General ASA Status: 2          Anesthesia Type: General    Mina Phase I: Mina Score: 8    Mina Phase II:        Anesthesia Post Evaluation    Patient location during evaluation: PACU  Patient participation: complete - patient participated  Level of consciousness: sleepy but conscious and responsive to verbal stimuli  Airway patency: patent  Nausea & Vomiting: no vomiting and no nausea  Complications: no  Cardiovascular status: blood pressure returned to baseline and hemodynamically stable  Respiratory status: acceptable  Hydration status: stable

## 2023-11-11 NOTE — PROGRESS NOTES
[facility] Message sent to Dr. Oliver Brennan re: Eduardo was removed exactly 8 hours ago, pt has tried to void, does not feel urge yet, bladder scanned for only 172 ml. Do you want to give her more time or straight cath? She is drinking fluids well. [facility] Per Dr. Oliver Brennan, give her more time and have her walk more.

## 2023-11-11 NOTE — H&P
Acute Care Surgery Consult    Consulted by: Dr. Chiki Alston  PCP: Ashleigh Heck MD      Assessment:     65-year-old woman with acute on chronic cholecystitis. Recommend proceeding to the operating room for laparoscopic cholecystectomy. I have discussed with the patient the risks and benefits of surgery including but not limited to bleeding, infection, injury to adjacent structures, conversion to open operation, injury to common bile duct, complications of general anesthesia including ventilator dependence, cardiac arrest or even death. Questions have been answered and informed consent obtained. We will proceed with lap cholecystectomy with cholangiogram possible cholangiogram.        Plan:     Keep NPO  Abx  To OR for lap cholecystectomy and cholangiogram      HPI:      Joshua Ware is a 79 y.o. female who is seen in consultation for cholecystitis. She reports that she has actually been having symptoms on and off for 1 month. Lately it has become quite more severe and she thinks there is an association with food. She is unable to get comfortable. She actually has an appointment with a GI specialist later this month for this reason. In the ER she was noted to have normal labs. Both CT scan and ultrasound demonstrated a markedly distended gallbladder and a positive Piedra sign. Ultrasound demonstrated evidence of sludge. CT scan demonstrated evidence of pericholecystic fat stranding. She is otherwise pretty healthy. She walks over 1 mile per day. She lives in [address] [place] [address] with her . She has never had any prior abdominal surgeries. She has had a mastectomy. She takes no blood thinners. Her BMI is 24. Review of Systems:    A 10 point review of systems was negative aside from what is noted in the HPI. Problem List:     History reviewed. No pertinent past medical history. History reviewed. No pertinent family history.   Social History     Socioeconomic History    Marital status:

## 2023-11-12 VITALS
WEIGHT: 169.97 LBS | OXYGEN SATURATION: 96 % | SYSTOLIC BLOOD PRESSURE: 128 MMHG | TEMPERATURE: 98.1 F | RESPIRATION RATE: 16 BRPM | HEIGHT: 67 IN | DIASTOLIC BLOOD PRESSURE: 76 MMHG | BODY MASS INDEX: 26.68 KG/M2 | HEART RATE: 87 BPM

## 2023-11-12 LAB
ALBUMIN SERPL-MCNC: 2.5 G/DL (ref 3.5–5)
ALBUMIN/GLOB SERPL: 0.7 (ref 1.1–2.2)
ALP SERPL-CCNC: 81 U/L (ref 45–117)
ALT SERPL-CCNC: 54 U/L (ref 12–78)
ANION GAP SERPL CALC-SCNC: 3 MMOL/L (ref 5–15)
AST SERPL-CCNC: 35 U/L (ref 15–37)
BASOPHILS # BLD: 0 K/UL (ref 0–0.1)
BASOPHILS NFR BLD: 0 % (ref 0–1)
BILIRUB SERPL-MCNC: 0.3 MG/DL (ref 0.2–1)
BUN SERPL-MCNC: 14 MG/DL (ref 6–20)
BUN/CREAT SERPL: 20 (ref 12–20)
CALCIUM SERPL-MCNC: 8.5 MG/DL (ref 8.5–10.1)
CHLORIDE SERPL-SCNC: 108 MMOL/L (ref 97–108)
CO2 SERPL-SCNC: 29 MMOL/L (ref 21–32)
CREAT SERPL-MCNC: 0.71 MG/DL (ref 0.55–1.02)
DIFFERENTIAL METHOD BLD: ABNORMAL
EOSINOPHIL # BLD: 0 K/UL (ref 0–0.4)
EOSINOPHIL NFR BLD: 0 % (ref 0–7)
ERYTHROCYTE [DISTWIDTH] IN BLOOD BY AUTOMATED COUNT: 13.4 % (ref 11.5–14.5)
GLOBULIN SER CALC-MCNC: 3.4 G/DL (ref 2–4)
GLUCOSE SERPL-MCNC: 138 MG/DL (ref 65–100)
HCT VFR BLD AUTO: 33.7 % (ref 35–47)
HGB BLD-MCNC: 11.3 G/DL (ref 11.5–16)
IMM GRANULOCYTES # BLD AUTO: 0 K/UL (ref 0–0.04)
IMM GRANULOCYTES NFR BLD AUTO: 0 % (ref 0–0.5)
LYMPHOCYTES # BLD: 1.4 K/UL (ref 0.8–3.5)
LYMPHOCYTES NFR BLD: 18 % (ref 12–49)
MCH RBC QN AUTO: 31 PG (ref 26–34)
MCHC RBC AUTO-ENTMCNC: 33.5 G/DL (ref 30–36.5)
MCV RBC AUTO: 92.6 FL (ref 80–99)
MONOCYTES # BLD: 0.6 K/UL (ref 0–1)
MONOCYTES NFR BLD: 7 % (ref 5–13)
NEUTS SEG # BLD: 5.7 K/UL (ref 1.8–8)
NEUTS SEG NFR BLD: 75 % (ref 32–75)
NRBC # BLD: 0 K/UL (ref 0–0.01)
NRBC BLD-RTO: 0 PER 100 WBC
PLATELET # BLD AUTO: 250 K/UL (ref 150–400)
PMV BLD AUTO: 9.4 FL (ref 8.9–12.9)
POTASSIUM SERPL-SCNC: 3.6 MMOL/L (ref 3.5–5.1)
PROT SERPL-MCNC: 5.9 G/DL (ref 6.4–8.2)
RBC # BLD AUTO: 3.64 M/UL (ref 3.8–5.2)
SODIUM SERPL-SCNC: 140 MMOL/L (ref 136–145)
WBC # BLD AUTO: 7.7 K/UL (ref 3.6–11)

## 2023-11-12 PROCEDURE — 6370000000 HC RX 637 (ALT 250 FOR IP): Performed by: STUDENT IN AN ORGANIZED HEALTH CARE EDUCATION/TRAINING PROGRAM

## 2023-11-12 PROCEDURE — 85025 COMPLETE CBC W/AUTO DIFF WBC: CPT

## 2023-11-12 PROCEDURE — 2580000003 HC RX 258: Performed by: STUDENT IN AN ORGANIZED HEALTH CARE EDUCATION/TRAINING PROGRAM

## 2023-11-12 PROCEDURE — 36415 COLL VENOUS BLD VENIPUNCTURE: CPT

## 2023-11-12 PROCEDURE — 80053 COMPREHEN METABOLIC PANEL: CPT

## 2023-11-12 RX ADMIN — ACETAMINOPHEN 650 MG: 325 TABLET ORAL at 09:38

## 2023-11-12 RX ADMIN — ACETAMINOPHEN 650 MG: 325 TABLET ORAL at 02:38

## 2023-11-12 RX ADMIN — SODIUM CHLORIDE, PRESERVATIVE FREE 10 ML: 5 INJECTION INTRAVENOUS at 09:39

## 2023-11-12 ASSESSMENT — PAIN SCALES - GENERAL
PAINLEVEL_OUTOF10: 0
PAINLEVEL_OUTOF10: 4

## 2023-11-12 ASSESSMENT — PAIN DESCRIPTION - LOCATION: LOCATION: ABDOMEN

## 2023-11-12 ASSESSMENT — PAIN DESCRIPTION - ORIENTATION: ORIENTATION: RIGHT

## 2023-11-12 ASSESSMENT — PAIN DESCRIPTION - DESCRIPTORS: DESCRIPTORS: ACHING

## 2023-11-12 NOTE — PLAN OF CARE
Problem: Pain  Goal: Verbalizes/displays adequate comfort level or baseline comfort level  11/12/2023 1101 by Taniya Griffiths RN  Outcome: Adequate for Discharge  11/12/2023 0249 by Joana Gates RN  Outcome: Progressing     Problem: Discharge Planning  Goal: Discharge to home or other facility with appropriate resources  Outcome: Adequate for Discharge     Problem: Safety - Adult  Goal: Free from fall injury  11/12/2023 1101 by Taniya Griffiths RN  Outcome: Adequate for Discharge  11/12/2023 0249 by Joana Gates RN  Outcome: Progressing     Problem: ABCDS Injury Assessment  Goal: Absence of physical injury  11/12/2023 1101 by Taniya Griffiths RN  Outcome: Adequate for Discharge  11/12/2023 0249 by Joana Gates RN  Outcome: Progressing

## 2023-11-12 NOTE — DISCHARGE SUMMARY
Physician Discharge Summary     Patient ID:  Karen Macedo  [id number]  80 y.o.  1955    Allergies: Penicillins and Tetanus toxoids    Admit Date: 11/10/2023    Discharge Date: 11/12/2023    * Admission Diagnoses: Cholecystitis [K81.9]  Calculus of gallbladder with acute cholecystitis without obstruction [K80.00]    * Discharge Diagnoses:       Admission Condition: Poor    * Discharge Condition: Improved    * Procedures: Procedure(s):  CHOLECYSTECTOMY LAPAROSCOPIC CHOLANGIOGRAM    [facility] Course:   Normal hospital course for this procedure. Consults: None    Disposition: Home    Discharge Medications:      Medication List        START taking these medications      HYDROcodone-acetaminophen 5-325 MG per tablet  Commonly known as: Lorcet  Take 1 tablet by mouth every 4 hours as needed for Pain for up to 5 days. Intended supply: 5 days. Take lowest dose possible to manage pain Max Daily Amount: 6 tablets     ondansetron 4 MG disintegrating tablet  Commonly known as: ZOFRAN-ODT  Take 1 tablet by mouth 3 times daily as needed for Nausea or Vomiting            CONTINUE taking these medications      calcium carbonate 600 MG Tabs tablet     Ginger Root 550 MG Caps     Myrbetriq 50 MG Tb24  Generic drug: mirabegron     NONFORMULARY     QC Vitamin C with Tish Hips 500 MG tablet  Generic drug: ascorbic acid     triamterene-hydroCHLOROthiazide 37.5-25 MG per tablet  Commonly known as: MAXZIDE-25     TURMERIC PO     vitamin B-12 1000 MCG tablet  Commonly known as: CYANOCOBALAMIN     vitamin B-6 100 MG tablet  Commonly known as: PYRIDOXINE     * Vitamin D3 20 MCG (800 UNIT) Tabs     * Vitamin D3 125 MCG (5000 UT) Tabs     zinc 50 MG Tabs tablet           * This list has 2 medication(s) that are the same as other medications prescribed for you. Read the directions carefully, and ask your doctor or other care provider to review them with you.                    Where to Get Your Medications        These medications were

## 2023-11-12 NOTE — PROGRESS NOTES
DISCHARGE NOTE FROM [facility] NURSE    Patient determined to be stable for discharge by attending provider. I have reviewed the discharge instructions and follow-up appointments with the Patient. They verbalized understanding and all questions were answered to their satisfaction. No complaints or further questions were expressed. Medications sent to pharmacy Appropriate educational materials and medication side effect teaching were provided. PIV were removed prior to discharge. Patient did not discharge with any line, teague, or drain. All personal items collected during admission were returned to the patient prior to discharge. Post-op patient: Yes - Patient given post-op discharge kit and instructed on use.       Jean Lepe RN

## 2023-11-14 LAB
EKG ATRIAL RATE: 92 BPM
EKG DIAGNOSIS: NORMAL
EKG P AXIS: 51 DEGREES
EKG P-R INTERVAL: 146 MS
EKG Q-T INTERVAL: 360 MS
EKG QRS DURATION: 64 MS
EKG QTC CALCULATION (BAZETT): 445 MS
EKG R AXIS: 29 DEGREES
EKG T AXIS: 39 DEGREES
EKG VENTRICULAR RATE: 92 BPM

## 2023-11-29 ENCOUNTER — OFFICE VISIT (OUTPATIENT)
Age: 68
End: 2023-11-29

## 2023-11-29 VITALS
HEIGHT: 67 IN | RESPIRATION RATE: 16 BRPM | OXYGEN SATURATION: 98 % | HEART RATE: 89 BPM | WEIGHT: 150 LBS | SYSTOLIC BLOOD PRESSURE: 139 MMHG | TEMPERATURE: 98.1 F | BODY MASS INDEX: 23.54 KG/M2 | DIASTOLIC BLOOD PRESSURE: 81 MMHG

## 2023-11-29 DIAGNOSIS — Z09 POSTOPERATIVE EXAMINATION: Primary | ICD-10-CM

## 2023-11-29 ASSESSMENT — PATIENT HEALTH QUESTIONNAIRE - PHQ9
2. FEELING DOWN, DEPRESSED OR HOPELESS: 0
SUM OF ALL RESPONSES TO PHQ QUESTIONS 1-9: 0
SUM OF ALL RESPONSES TO PHQ QUESTIONS 1-9: 0
1. LITTLE INTEREST OR PLEASURE IN DOING THINGS: 0
SUM OF ALL RESPONSES TO PHQ QUESTIONS 1-9: 0
SUM OF ALL RESPONSES TO PHQ QUESTIONS 1-9: 0
SUM OF ALL RESPONSES TO PHQ9 QUESTIONS 1 & 2: 0

## 2023-11-29 NOTE — PROGRESS NOTES
Identified pt with two pt identifiers(name and ). Reviewed record in preparation for visit and have obtained necessary documentation. All patient medications has been reviewed. Chief Complaint   Patient presents with    Post-Op Check     S/P LAPAROSCOPIC CHOLECYSTECTOMY 11/10/2023       Health Maintenance Due   Topic    COVID-19 Vaccine (1)    Depression Screen     Hepatitis C screen     Lipids     Colorectal Cancer Screen     Breast cancer screen     Shingles vaccine (1 of 2)    Respiratory Syncytial Virus (RSV) age 61 yrs+ (3 - 1-dose 60+ series)    Pneumococcal 65+ years Vaccine (1 - PCV)    Flu vaccine (1)    Annual Wellness Visit (AWV)        @[place] Gardens[place]@    4. Have you been to the ER, urgent care clinic since your last visit? Hospitalized since your last visit?no    5. Have you seen or consulted any other health care providers outside of the [address] [place] [address] since your last visit? Include any pap smears or colon screening. no      Patient is accompanied by self I have received verbal consent from Rox Ramires to discuss any/all medical information while they are present in the room.

## 2023-12-11 ENCOUNTER — TELEPHONE (OUTPATIENT)
Age: 68
End: 2023-12-11

## 2023-12-11 ENCOUNTER — APPOINTMENT (OUTPATIENT)
Facility: HOSPITAL | Age: 68
DRG: 446 | End: 2023-12-11
Payer: MEDICARE

## 2023-12-11 ENCOUNTER — HOSPITAL ENCOUNTER (INPATIENT)
Facility: HOSPITAL | Age: 68
LOS: 2 days | Discharge: HOME | DRG: 446 | End: 2023-12-13
Attending: EMERGENCY MEDICINE | Admitting: INTERNAL MEDICINE
Payer: MEDICARE

## 2023-12-11 DIAGNOSIS — R74.01 TRANSAMINITIS: Primary | ICD-10-CM

## 2023-12-11 DIAGNOSIS — R10.9 RIGHT FLANK PAIN: ICD-10-CM

## 2023-12-11 DIAGNOSIS — K80.50 COMMON BILE DUCT STONE: ICD-10-CM

## 2023-12-11 LAB
ALBUMIN SERPL-MCNC: 3.6 G/DL (ref 3.5–5)
ALBUMIN/GLOB SERPL: 0.9 (ref 1.1–2.2)
ALP SERPL-CCNC: 226 U/L (ref 45–117)
ALT SERPL-CCNC: 1174 U/L (ref 12–78)
ANION GAP SERPL CALC-SCNC: 7 MMOL/L (ref 5–15)
APPEARANCE UR: CLEAR
AST SERPL-CCNC: 1386 U/L (ref 15–37)
BACTERIA URNS QL MICRO: ABNORMAL /HPF
BASOPHILS # BLD: 0.1 K/UL (ref 0–0.1)
BASOPHILS NFR BLD: 1 % (ref 0–1)
BILIRUB SERPL-MCNC: 3.7 MG/DL (ref 0.2–1)
BILIRUB UR QL CFM: POSITIVE
BUN SERPL-MCNC: 11 MG/DL (ref 6–20)
BUN/CREAT SERPL: 13 (ref 12–20)
CALCIUM SERPL-MCNC: 10.2 MG/DL (ref 8.5–10.1)
CHLORIDE SERPL-SCNC: 101 MMOL/L (ref 97–108)
CO2 SERPL-SCNC: 29 MMOL/L (ref 21–32)
COLOR UR: ABNORMAL
CREAT SERPL-MCNC: 0.84 MG/DL (ref 0.55–1.02)
DIFFERENTIAL METHOD BLD: ABNORMAL
EOSINOPHIL # BLD: 0 K/UL (ref 0–0.4)
EOSINOPHIL NFR BLD: 0 % (ref 0–7)
EPITH CASTS URNS QL MICRO: ABNORMAL /LPF
ERYTHROCYTE [DISTWIDTH] IN BLOOD BY AUTOMATED COUNT: 13.2 % (ref 11.5–14.5)
GLOBULIN SER CALC-MCNC: 3.9 G/DL (ref 2–4)
GLUCOSE SERPL-MCNC: 160 MG/DL (ref 65–100)
GLUCOSE UR STRIP.AUTO-MCNC: NEGATIVE MG/DL
HCT VFR BLD AUTO: 40.5 % (ref 35–47)
HGB BLD-MCNC: 13.4 G/DL (ref 11.5–16)
HGB UR QL STRIP: NEGATIVE
HYALINE CASTS URNS QL MICRO: ABNORMAL /LPF (ref 0–2)
IMM GRANULOCYTES # BLD AUTO: 0 K/UL (ref 0–0.04)
IMM GRANULOCYTES NFR BLD AUTO: 0 % (ref 0–0.5)
KETONES UR QL STRIP.AUTO: NEGATIVE MG/DL
LACTATE SERPL-SCNC: 0.7 MMOL/L (ref 0.4–2)
LEUKOCYTE ESTERASE UR QL STRIP.AUTO: ABNORMAL
LIPASE SERPL-CCNC: 36 U/L (ref 13–75)
LYMPHOCYTES # BLD: 0.5 K/UL (ref 0.8–3.5)
LYMPHOCYTES NFR BLD: 5 % (ref 12–49)
MCH RBC QN AUTO: 29.8 PG (ref 26–34)
MCHC RBC AUTO-ENTMCNC: 33.1 G/DL (ref 30–36.5)
MCV RBC AUTO: 90 FL (ref 80–99)
MONOCYTES # BLD: 0.2 K/UL (ref 0–1)
MONOCYTES NFR BLD: 2 % (ref 5–13)
NEUTS BAND NFR BLD MANUAL: 5 %
NEUTS SEG # BLD: 9.1 K/UL (ref 1.8–8)
NEUTS SEG NFR BLD: 87 % (ref 32–75)
NITRITE UR QL STRIP.AUTO: NEGATIVE
NRBC # BLD: 0 K/UL (ref 0–0.01)
NRBC BLD-RTO: 0 PER 100 WBC
PH UR STRIP: 6.5 (ref 5–8)
PLATELET # BLD AUTO: 259 K/UL (ref 150–400)
PMV BLD AUTO: 9.6 FL (ref 8.9–12.9)
POTASSIUM SERPL-SCNC: 3.1 MMOL/L (ref 3.5–5.1)
PROCALCITONIN SERPL-MCNC: 0.99 NG/ML
PROT SERPL-MCNC: 7.5 G/DL (ref 6.4–8.2)
PROT UR STRIP-MCNC: NEGATIVE MG/DL
RBC # BLD AUTO: 4.5 M/UL (ref 3.8–5.2)
RBC #/AREA URNS HPF: ABNORMAL /HPF (ref 0–5)
RBC MORPH BLD: ABNORMAL
SODIUM SERPL-SCNC: 137 MMOL/L (ref 136–145)
SP GR UR REFRACTOMETRY: 1.01
URINE CULTURE IF INDICATED: ABNORMAL
UROBILINOGEN UR QL STRIP.AUTO: 1 EU/DL (ref 0.2–1)
WBC # BLD AUTO: 9.9 K/UL (ref 3.6–11)
WBC MORPH BLD: ABNORMAL
WBC URNS QL MICRO: ABNORMAL /HPF (ref 0–4)

## 2023-12-11 PROCEDURE — 6360000004 HC RX CONTRAST MEDICATION: Performed by: EMERGENCY MEDICINE

## 2023-12-11 PROCEDURE — 96374 THER/PROPH/DIAG INJ IV PUSH: CPT

## 2023-12-11 PROCEDURE — 6360000002 HC RX W HCPCS

## 2023-12-11 PROCEDURE — 1100000003 HC PRIVATE W/ TELEMETRY

## 2023-12-11 PROCEDURE — 74177 CT ABD & PELVIS W/CONTRAST: CPT

## 2023-12-11 PROCEDURE — 6360000002 HC RX W HCPCS: Performed by: EMERGENCY MEDICINE

## 2023-12-11 PROCEDURE — 81001 URINALYSIS AUTO W/SCOPE: CPT

## 2023-12-11 PROCEDURE — 87040 BLOOD CULTURE FOR BACTERIA: CPT

## 2023-12-11 PROCEDURE — 2580000003 HC RX 258: Performed by: EMERGENCY MEDICINE

## 2023-12-11 PROCEDURE — 2580000003 HC RX 258: Performed by: NURSE PRACTITIONER

## 2023-12-11 PROCEDURE — 96375 TX/PRO/DX INJ NEW DRUG ADDON: CPT

## 2023-12-11 PROCEDURE — 99285 EMERGENCY DEPT VISIT HI MDM: CPT

## 2023-12-11 PROCEDURE — 80053 COMPREHEN METABOLIC PANEL: CPT

## 2023-12-11 PROCEDURE — 84145 PROCALCITONIN (PCT): CPT

## 2023-12-11 PROCEDURE — 83605 ASSAY OF LACTIC ACID: CPT

## 2023-12-11 PROCEDURE — 36415 COLL VENOUS BLD VENIPUNCTURE: CPT

## 2023-12-11 PROCEDURE — 83690 ASSAY OF LIPASE: CPT

## 2023-12-11 PROCEDURE — 6370000000 HC RX 637 (ALT 250 FOR IP): Performed by: NURSE PRACTITIONER

## 2023-12-11 PROCEDURE — 85025 COMPLETE CBC W/AUTO DIFF WBC: CPT

## 2023-12-11 PROCEDURE — 71045 X-RAY EXAM CHEST 1 VIEW: CPT

## 2023-12-11 RX ORDER — TROSPIUM CHLORIDE 20 MG/1
20 TABLET, FILM COATED ORAL
Status: DISCONTINUED | OUTPATIENT
Start: 2023-12-11 | End: 2023-12-13 | Stop reason: HOSPADM

## 2023-12-11 RX ORDER — TRIAMTERENE AND HYDROCHLOROTHIAZIDE 37.5; 25 MG/1; MG/1
1 TABLET ORAL DAILY
Status: DISCONTINUED | OUTPATIENT
Start: 2023-12-12 | End: 2023-12-13 | Stop reason: HOSPADM

## 2023-12-11 RX ORDER — LABETALOL HYDROCHLORIDE 5 MG/ML
5 INJECTION, SOLUTION INTRAVENOUS EVERY 6 HOURS PRN
Status: DISCONTINUED | OUTPATIENT
Start: 2023-12-11 | End: 2023-12-13 | Stop reason: HOSPADM

## 2023-12-11 RX ORDER — ONDANSETRON 2 MG/ML
4 INJECTION INTRAMUSCULAR; INTRAVENOUS EVERY 6 HOURS PRN
Status: DISCONTINUED | OUTPATIENT
Start: 2023-12-11 | End: 2023-12-13 | Stop reason: HOSPADM

## 2023-12-11 RX ORDER — ACETAMINOPHEN 650 MG/1
650 SUPPOSITORY RECTAL EVERY 4 HOURS PRN
Status: DISCONTINUED | OUTPATIENT
Start: 2023-12-11 | End: 2023-12-13 | Stop reason: HOSPADM

## 2023-12-11 RX ORDER — POTASSIUM CHLORIDE 20 MEQ/1
40 TABLET, EXTENDED RELEASE ORAL ONCE
Status: COMPLETED | OUTPATIENT
Start: 2023-12-11 | End: 2023-12-11

## 2023-12-11 RX ORDER — POLYETHYLENE GLYCOL 3350 17 G/17G
17 POWDER, FOR SOLUTION ORAL DAILY PRN
Status: DISCONTINUED | OUTPATIENT
Start: 2023-12-11 | End: 2023-12-13 | Stop reason: HOSPADM

## 2023-12-11 RX ORDER — MORPHINE SULFATE 4 MG/ML
4 INJECTION, SOLUTION INTRAMUSCULAR; INTRAVENOUS
Status: COMPLETED | OUTPATIENT
Start: 2023-12-11 | End: 2023-12-11

## 2023-12-11 RX ORDER — SODIUM CHLORIDE 0.9 % (FLUSH) 0.9 %
5-40 SYRINGE (ML) INJECTION EVERY 12 HOURS SCHEDULED
Status: DISCONTINUED | OUTPATIENT
Start: 2023-12-11 | End: 2023-12-12 | Stop reason: HOSPADM

## 2023-12-11 RX ORDER — ONDANSETRON 2 MG/ML
4 INJECTION INTRAMUSCULAR; INTRAVENOUS ONCE
Status: COMPLETED | OUTPATIENT
Start: 2023-12-11 | End: 2023-12-11

## 2023-12-11 RX ORDER — MORPHINE SULFATE 2 MG/ML
1 INJECTION, SOLUTION INTRAMUSCULAR; INTRAVENOUS EVERY 4 HOURS PRN
Status: DISCONTINUED | OUTPATIENT
Start: 2023-12-11 | End: 2023-12-13 | Stop reason: HOSPADM

## 2023-12-11 RX ORDER — SODIUM CHLORIDE 9 MG/ML
25 INJECTION, SOLUTION INTRAVENOUS PRN
Status: DISCONTINUED | OUTPATIENT
Start: 2023-12-11 | End: 2023-12-12 | Stop reason: HOSPADM

## 2023-12-11 RX ORDER — INDOMETHACIN 50 MG/1
100 SUPPOSITORY RECTAL ONCE
Status: COMPLETED | OUTPATIENT
Start: 2023-12-12 | End: 2023-12-12

## 2023-12-11 RX ORDER — LEVOFLOXACIN 5 MG/ML
750 INJECTION, SOLUTION INTRAVENOUS EVERY 24 HOURS
Status: DISCONTINUED | OUTPATIENT
Start: 2023-12-11 | End: 2023-12-13 | Stop reason: HOSPADM

## 2023-12-11 RX ORDER — ONDANSETRON 4 MG/1
4 TABLET, ORALLY DISINTEGRATING ORAL EVERY 8 HOURS PRN
Status: DISCONTINUED | OUTPATIENT
Start: 2023-12-11 | End: 2023-12-13 | Stop reason: HOSPADM

## 2023-12-11 RX ORDER — LEVOFLOXACIN 5 MG/ML
500 INJECTION, SOLUTION INTRAVENOUS ONCE
Status: DISCONTINUED | OUTPATIENT
Start: 2023-12-11 | End: 2023-12-11 | Stop reason: SDUPTHER

## 2023-12-11 RX ORDER — ACETAMINOPHEN 325 MG/1
650 TABLET ORAL EVERY 4 HOURS PRN
Status: DISCONTINUED | OUTPATIENT
Start: 2023-12-11 | End: 2023-12-13 | Stop reason: HOSPADM

## 2023-12-11 RX ORDER — 0.9 % SODIUM CHLORIDE 0.9 %
1000 INTRAVENOUS SOLUTION INTRAVENOUS ONCE
Status: COMPLETED | OUTPATIENT
Start: 2023-12-11 | End: 2023-12-11

## 2023-12-11 RX ORDER — SODIUM CHLORIDE 9 MG/ML
INJECTION, SOLUTION INTRAVENOUS CONTINUOUS
Status: DISCONTINUED | OUTPATIENT
Start: 2023-12-11 | End: 2023-12-12

## 2023-12-11 RX ORDER — METRONIDAZOLE 500 MG/100ML
500 INJECTION, SOLUTION INTRAVENOUS EVERY 8 HOURS
Status: DISCONTINUED | OUTPATIENT
Start: 2023-12-11 | End: 2023-12-13 | Stop reason: HOSPADM

## 2023-12-11 RX ORDER — LANOLIN ALCOHOL/MO/W.PET/CERES
1.5 CREAM (GRAM) TOPICAL NIGHTLY PRN
Status: DISCONTINUED | OUTPATIENT
Start: 2023-12-11 | End: 2023-12-13 | Stop reason: HOSPADM

## 2023-12-11 RX ORDER — SODIUM CHLORIDE 0.9 % (FLUSH) 0.9 %
5-40 SYRINGE (ML) INJECTION PRN
Status: DISCONTINUED | OUTPATIENT
Start: 2023-12-11 | End: 2023-12-12 | Stop reason: HOSPADM

## 2023-12-11 RX ORDER — SENNA AND DOCUSATE SODIUM 50; 8.6 MG/1; MG/1
2 TABLET, FILM COATED ORAL DAILY PRN
Status: DISCONTINUED | OUTPATIENT
Start: 2023-12-11 | End: 2023-12-13 | Stop reason: HOSPADM

## 2023-12-11 RX ORDER — OXYCODONE HYDROCHLORIDE AND ACETAMINOPHEN 5; 325 MG/1; MG/1
1 TABLET ORAL EVERY 6 HOURS PRN
Status: DISCONTINUED | OUTPATIENT
Start: 2023-12-11 | End: 2023-12-13 | Stop reason: HOSPADM

## 2023-12-11 RX ADMIN — SODIUM CHLORIDE 1000 ML: 9 INJECTION, SOLUTION INTRAVENOUS at 12:08

## 2023-12-11 RX ADMIN — POTASSIUM CHLORIDE 40 MEQ: 1500 TABLET, EXTENDED RELEASE ORAL at 16:18

## 2023-12-11 RX ADMIN — SODIUM CHLORIDE: 9 INJECTION, SOLUTION INTRAVENOUS at 18:51

## 2023-12-11 RX ADMIN — MORPHINE SULFATE 4 MG: 4 INJECTION, SOLUTION INTRAMUSCULAR; INTRAVENOUS at 12:07

## 2023-12-11 RX ADMIN — IOPAMIDOL 100 ML: 755 INJECTION, SOLUTION INTRAVENOUS at 12:49

## 2023-12-11 RX ADMIN — LEVOFLOXACIN 750 MG: 5 INJECTION, SOLUTION INTRAVENOUS at 16:11

## 2023-12-11 RX ADMIN — METRONIDAZOLE 500 MG: 500 INJECTION, SOLUTION INTRAVENOUS at 18:01

## 2023-12-11 RX ADMIN — ONDANSETRON 4 MG: 2 INJECTION INTRAMUSCULAR; INTRAVENOUS at 12:07

## 2023-12-11 RX ADMIN — METRONIDAZOLE 500 MG: 500 INJECTION, SOLUTION INTRAVENOUS at 22:32

## 2023-12-11 ASSESSMENT — PAIN - FUNCTIONAL ASSESSMENT: PAIN_FUNCTIONAL_ASSESSMENT: 0-10

## 2023-12-11 ASSESSMENT — PAIN SCALES - GENERAL
PAINLEVEL_OUTOF10: 0
PAINLEVEL_OUTOF10: 6
PAINLEVEL_OUTOF10: 6

## 2023-12-11 ASSESSMENT — PAIN DESCRIPTION - LOCATION: LOCATION: ABDOMEN

## 2023-12-11 NOTE — CONSULTS
Procedure Laterality Date    CHOLECYSTECTOMY, LAPAROSCOPIC N/A 11/10/2023    CHOLECYSTECTOMY LAPAROSCOPIC CHOLANGIOGRAM performed by Anant eMrchant MD at [facility] MAIN OR     Social History     Tobacco Use    Smoking status: Former     Types: Cigarettes     Quit date: 1983     Years since quittin.1    Smokeless tobacco: Never   Substance Use Topics    Alcohol use: Not Currently      No family history on file. Allergies   Allergen Reactions    Penicillins Hives    Tetanus Toxoids Hives      Prior to Admission medications    Medication Sig Start Date End Date Taking? Authorizing Provider   MYRBETRIQ 50 MG TB24  10/5/23   Julius Alcaraz MD   triamterene-hydroCHLOROthiazide (MAXZIDE-25) 37.5-25 MG per tablet  23   Julius Alcaraz MD   calcium carbonate 600 MG TABS tablet Take 1 tablet by mouth 2 times daily    Julius Alcaraz MD   Cholecalciferol (VITAMIN D3) 20 MCG (800 UNIT) TABS Take 20 mcg by mouth 2 times daily    Julius Alcaraz MD   ascorbic acid (QC VITAMIN C WITH ABRAHAN HIPS) 500 MG tablet Take 2 tablets by mouth daily Abrahan hip 8 mg    Julius Alcaraz MD   vitamin B-6 (PYRIDOXINE) 100 MG tablet Take 1 tablet by mouth daily    Julius Alcaraz MD   NONFORMULARY Take 36 mg by mouth daily [place] Ultra?     Julius Alcaraz MD   zinc 50 MG TABS tablet Take 1 tablet by mouth daily    Julius Alcaraz MD   Ginger, Zingiber officinalis, (GINGER ROOT) 550 MG CAPS Take 1 capsule by mouth at bedtime    Julius Alcaraz MD   vitamin B-12 (CYANOCOBALAMIN) 1000 MCG tablet Take 1 tablet by mouth at bedtime    Julius Alcaraz MD   Cholecalciferol (VITAMIN D3) 125 MCG (5000 UT) TABS Take 1 tablet by mouth at bedtime    Julius Alcaraz MD   TURMERIC PO Take 1,000 mg by mouth at bedtime    Julius Alcaraz MD   ondansetron (ZOFRAN-ODT) 4 MG disintegrating tablet Take 1 tablet by mouth 3 times daily as needed for Nausea or Vomiting 11/10/23

## 2023-12-11 NOTE — ED PROVIDER NOTES
mouth 3 times daily as needed for Nausea or Vomiting     QC Vitamin C with Tish Hips 500 MG tablet  Generic drug: ascorbic acid     triamterene-hydroCHLOROthiazide 37.5-25 MG per tablet  Commonly known as: MAXZIDE-25     TURMERIC PO     vitamin B-12 1000 MCG tablet  Commonly known as: CYANOCOBALAMIN     vitamin B-6 100 MG tablet  Commonly known as: PYRIDOXINE     * Vitamin D3 20 MCG (800 UNIT) Tabs     * Vitamin D3 125 MCG (5000 UT) Tabs     zinc 50 MG Tabs tablet           * This list has 2 medication(s) that are the same as other medications prescribed for you. Read the directions carefully, and ask your doctor or other care provider to review them with you. DISCONTINUED MEDICATIONS:  Current Discharge Medication List          I am the Primary Clinician of Record. Silvia Patino MD (electronically signed)    (Please note that parts of this dictation were completed with voice recognition software. Quite often unanticipated grammatical, syntax, homophones, and other interpretive errors are inadvertently transcribed by the computer software. Please disregards these errors.  Please excuse any errors that have escaped final proofreading.)        Silvia Patino MD  12/11/23 4693

## 2023-12-11 NOTE — H&P
level of a 6 mm calculus (coronal image 9). SPLEEN: within normal limits. PANCREAS: No mass or ductal dilatation. ADRENALS: Unremarkable. KIDNEYS: No mass, calculus, or hydronephrosis. STOMACH: Unremarkable. SMALL BOWEL: No dilatation or wall thickening. COLON: No dilatation or wall thickening. Multiple sigmoid diverticula without inflammatory change APPENDIX: Normal on axial image 45 PERITONEUM: No pneumoperitoneum. RETROPERITONEUM: No lymphadenopathy or aortic aneurysm. REPRODUCTIVE ORGANS: Prior hysterectomy URINARY BLADDER: No mass or calculus. BONES: No destructive bone lesion. ABDOMINAL WALL: No mass or hernia. ADDITIONAL COMMENTS: N/A     Mild intrahepatic and extrahepatic biliary dilatation patient with a distal common bile duct stone Minimal fluid in the cholecystectomy bed.     _______________________________________________________________________    TOTAL TIME:  50 Minutes    Critical Care Provided     Minutes non procedure based    Signed: EDU Merida NP    Procedures: see electronic medical records for all procedures/Xrays and details which were not copied into this note but were reviewed prior to creation of Plan.

## 2023-12-11 NOTE — TELEPHONE ENCOUNTER
Spoke with patient 2 identifiers. S/P lap cholecystectomy 11/10/23. Having back pain 8/10  with temp 99 to 101. Taking naproxen and excedrin with little relief. Patient is in rout to  ER.

## 2023-12-11 NOTE — TELEPHONE ENCOUNTER
Patient called is experiencing pain in back and where surgery was done, also has a fever requesting to speak to nurse with what to do whether it be to go back to er or to follow up please advise    [phone]

## 2023-12-12 ENCOUNTER — ANESTHESIA (OUTPATIENT)
Facility: HOSPITAL | Age: 68
DRG: 446 | End: 2023-12-12
Payer: MEDICARE

## 2023-12-12 ENCOUNTER — ANESTHESIA EVENT (OUTPATIENT)
Facility: HOSPITAL | Age: 68
DRG: 446 | End: 2023-12-12
Payer: MEDICARE

## 2023-12-12 ENCOUNTER — APPOINTMENT (OUTPATIENT)
Facility: HOSPITAL | Age: 68
DRG: 446 | End: 2023-12-12
Payer: MEDICARE

## 2023-12-12 LAB
ALBUMIN SERPL-MCNC: 2.5 G/DL (ref 3.5–5)
ALBUMIN SERPL-MCNC: 2.6 G/DL (ref 3.5–5)
ALBUMIN/GLOB SERPL: 0.9 (ref 1.1–2.2)
ALBUMIN/GLOB SERPL: 0.9 (ref 1.1–2.2)
ALP SERPL-CCNC: 147 U/L (ref 45–117)
ALP SERPL-CCNC: 147 U/L (ref 45–117)
ALT SERPL-CCNC: 524 U/L (ref 12–78)
ALT SERPL-CCNC: 535 U/L (ref 12–78)
ANION GAP SERPL CALC-SCNC: 7 MMOL/L (ref 5–15)
AST SERPL-CCNC: 369 U/L (ref 15–37)
AST SERPL-CCNC: 377 U/L (ref 15–37)
BASOPHILS # BLD: 0 K/UL (ref 0–0.1)
BASOPHILS NFR BLD: 1 % (ref 0–1)
BILIRUB DIRECT SERPL-MCNC: 3.6 MG/DL (ref 0–0.2)
BILIRUB SERPL-MCNC: 4.2 MG/DL (ref 0.2–1)
BILIRUB SERPL-MCNC: 4.5 MG/DL (ref 0.2–1)
BUN SERPL-MCNC: 10 MG/DL (ref 6–20)
BUN/CREAT SERPL: 13 (ref 12–20)
CALCIUM SERPL-MCNC: 8.1 MG/DL (ref 8.5–10.1)
CHLORIDE SERPL-SCNC: 110 MMOL/L (ref 97–108)
CO2 SERPL-SCNC: 23 MMOL/L (ref 21–32)
CREAT SERPL-MCNC: 0.79 MG/DL (ref 0.55–1.02)
DIFFERENTIAL METHOD BLD: ABNORMAL
EKG ATRIAL RATE: 85 BPM
EKG DIAGNOSIS: NORMAL
EKG P AXIS: 35 DEGREES
EKG P-R INTERVAL: 112 MS
EKG Q-T INTERVAL: 406 MS
EKG QRS DURATION: 68 MS
EKG QTC CALCULATION (BAZETT): 483 MS
EKG R AXIS: -5 DEGREES
EKG T AXIS: -12 DEGREES
EKG VENTRICULAR RATE: 85 BPM
EOSINOPHIL # BLD: 0.1 K/UL (ref 0–0.4)
EOSINOPHIL NFR BLD: 3 % (ref 0–7)
ERYTHROCYTE [DISTWIDTH] IN BLOOD BY AUTOMATED COUNT: 13.8 % (ref 11.5–14.5)
GLOBULIN SER CALC-MCNC: 2.9 G/DL (ref 2–4)
GLOBULIN SER CALC-MCNC: 2.9 G/DL (ref 2–4)
GLUCOSE SERPL-MCNC: 100 MG/DL (ref 65–100)
HCT VFR BLD AUTO: 33.4 % (ref 35–47)
HGB BLD-MCNC: 11 G/DL (ref 11.5–16)
IMM GRANULOCYTES # BLD AUTO: 0 K/UL (ref 0–0.04)
IMM GRANULOCYTES NFR BLD AUTO: 0 % (ref 0–0.5)
LYMPHOCYTES # BLD: 0.3 K/UL (ref 0.8–3.5)
LYMPHOCYTES NFR BLD: 13 % (ref 12–49)
MCH RBC QN AUTO: 30.1 PG (ref 26–34)
MCHC RBC AUTO-ENTMCNC: 32.9 G/DL (ref 30–36.5)
MCV RBC AUTO: 91.3 FL (ref 80–99)
MONOCYTES # BLD: 0.2 K/UL (ref 0–1)
MONOCYTES NFR BLD: 6 % (ref 5–13)
NEUTS SEG # BLD: 2 K/UL (ref 1.8–8)
NEUTS SEG NFR BLD: 77 % (ref 32–75)
NRBC # BLD: 0 K/UL (ref 0–0.01)
NRBC BLD-RTO: 0 PER 100 WBC
PLATELET # BLD AUTO: 180 K/UL (ref 150–400)
PMV BLD AUTO: 9.9 FL (ref 8.9–12.9)
POTASSIUM SERPL-SCNC: 3.7 MMOL/L (ref 3.5–5.1)
PROT SERPL-MCNC: 5.4 G/DL (ref 6.4–8.2)
PROT SERPL-MCNC: 5.5 G/DL (ref 6.4–8.2)
RBC # BLD AUTO: 3.66 M/UL (ref 3.8–5.2)
SODIUM SERPL-SCNC: 140 MMOL/L (ref 136–145)
WBC # BLD AUTO: 2.6 K/UL (ref 3.6–11)

## 2023-12-12 PROCEDURE — 7100000000 HC PACU RECOVERY - FIRST 15 MIN: Performed by: INTERNAL MEDICINE

## 2023-12-12 PROCEDURE — 0F798ZZ DILATION OF COMMON BILE DUCT, VIA NATURAL OR ARTIFICIAL OPENING ENDOSCOPIC: ICD-10-PCS | Performed by: INTERNAL MEDICINE

## 2023-12-12 PROCEDURE — 36415 COLL VENOUS BLD VENIPUNCTURE: CPT

## 2023-12-12 PROCEDURE — 2720000010 HC SURG SUPPLY STERILE: Performed by: INTERNAL MEDICINE

## 2023-12-12 PROCEDURE — 2580000003 HC RX 258: Performed by: INTERNAL MEDICINE

## 2023-12-12 PROCEDURE — 7100000001 HC PACU RECOVERY - ADDTL 15 MIN: Performed by: INTERNAL MEDICINE

## 2023-12-12 PROCEDURE — 94760 N-INVAS EAR/PLS OXIMETRY 1: CPT

## 2023-12-12 PROCEDURE — 1100000003 HC PRIVATE W/ TELEMETRY

## 2023-12-12 PROCEDURE — 3600007513: Performed by: INTERNAL MEDICINE

## 2023-12-12 PROCEDURE — 2709999900 HC NON-CHARGEABLE SUPPLY: Performed by: INTERNAL MEDICINE

## 2023-12-12 PROCEDURE — 3700000000 HC ANESTHESIA ATTENDED CARE: Performed by: INTERNAL MEDICINE

## 2023-12-12 PROCEDURE — 80076 HEPATIC FUNCTION PANEL: CPT

## 2023-12-12 PROCEDURE — 6360000002 HC RX W HCPCS

## 2023-12-12 PROCEDURE — 6370000000 HC RX 637 (ALT 250 FOR IP): Performed by: NURSE PRACTITIONER

## 2023-12-12 PROCEDURE — 2700000000 HC OXYGEN THERAPY PER DAY

## 2023-12-12 PROCEDURE — C1769 GUIDE WIRE: HCPCS | Performed by: INTERNAL MEDICINE

## 2023-12-12 PROCEDURE — 2580000003 HC RX 258: Performed by: NURSE PRACTITIONER

## 2023-12-12 PROCEDURE — 3600007503: Performed by: INTERNAL MEDICINE

## 2023-12-12 PROCEDURE — 6360000004 HC RX CONTRAST MEDICATION: Performed by: INTERNAL MEDICINE

## 2023-12-12 PROCEDURE — 2500000003 HC RX 250 WO HCPCS

## 2023-12-12 PROCEDURE — 85025 COMPLETE CBC W/AUTO DIFF WBC: CPT

## 2023-12-12 PROCEDURE — 3700000001 HC ADD 15 MINUTES (ANESTHESIA): Performed by: INTERNAL MEDICINE

## 2023-12-12 PROCEDURE — 80053 COMPREHEN METABOLIC PANEL: CPT

## 2023-12-12 PROCEDURE — 6370000000 HC RX 637 (ALT 250 FOR IP): Performed by: INTERNAL MEDICINE

## 2023-12-12 PROCEDURE — 0F998ZZ DRAINAGE OF COMMON BILE DUCT, VIA NATURAL OR ARTIFICIAL OPENING ENDOSCOPIC: ICD-10-PCS | Performed by: INTERNAL MEDICINE

## 2023-12-12 RX ORDER — SODIUM CHLORIDE, SODIUM LACTATE, POTASSIUM CHLORIDE, CALCIUM CHLORIDE 600; 310; 30; 20 MG/100ML; MG/100ML; MG/100ML; MG/100ML
INJECTION, SOLUTION INTRAVENOUS CONTINUOUS PRN
Status: COMPLETED | OUTPATIENT
Start: 2023-12-12 | End: 2023-12-12

## 2023-12-12 RX ORDER — SUCCINYLCHOLINE CHLORIDE 20 MG/ML
INJECTION INTRAMUSCULAR; INTRAVENOUS PRN
Status: DISCONTINUED | OUTPATIENT
Start: 2023-12-12 | End: 2023-12-12 | Stop reason: SDUPTHER

## 2023-12-12 RX ORDER — FENTANYL CITRATE 50 UG/ML
INJECTION, SOLUTION INTRAMUSCULAR; INTRAVENOUS PRN
Status: DISCONTINUED | OUTPATIENT
Start: 2023-12-12 | End: 2023-12-12 | Stop reason: SDUPTHER

## 2023-12-12 RX ORDER — DEXAMETHASONE SODIUM PHOSPHATE 4 MG/ML
INJECTION, SOLUTION INTRA-ARTICULAR; INTRALESIONAL; INTRAMUSCULAR; INTRAVENOUS; SOFT TISSUE PRN
Status: DISCONTINUED | OUTPATIENT
Start: 2023-12-12 | End: 2023-12-12 | Stop reason: SDUPTHER

## 2023-12-12 RX ORDER — ONDANSETRON 2 MG/ML
INJECTION INTRAMUSCULAR; INTRAVENOUS PRN
Status: DISCONTINUED | OUTPATIENT
Start: 2023-12-12 | End: 2023-12-12 | Stop reason: SDUPTHER

## 2023-12-12 RX ORDER — SODIUM CHLORIDE, SODIUM LACTATE, POTASSIUM CHLORIDE, CALCIUM CHLORIDE 600; 310; 30; 20 MG/100ML; MG/100ML; MG/100ML; MG/100ML
INJECTION, SOLUTION INTRAVENOUS CONTINUOUS
Status: DISCONTINUED | OUTPATIENT
Start: 2023-12-12 | End: 2023-12-13

## 2023-12-12 RX ORDER — LIDOCAINE HYDROCHLORIDE 20 MG/ML
INJECTION, SOLUTION EPIDURAL; INFILTRATION; INTRACAUDAL; PERINEURAL PRN
Status: DISCONTINUED | OUTPATIENT
Start: 2023-12-12 | End: 2023-12-12 | Stop reason: SDUPTHER

## 2023-12-12 RX ORDER — MIDAZOLAM HYDROCHLORIDE 1 MG/ML
INJECTION INTRAMUSCULAR; INTRAVENOUS PRN
Status: DISCONTINUED | OUTPATIENT
Start: 2023-12-12 | End: 2023-12-12 | Stop reason: SDUPTHER

## 2023-12-12 RX ADMIN — SODIUM CHLORIDE, PRESERVATIVE FREE 10 ML: 5 INJECTION INTRAVENOUS at 00:21

## 2023-12-12 RX ADMIN — METRONIDAZOLE 500 MG: 500 INJECTION, SOLUTION INTRAVENOUS at 14:46

## 2023-12-12 RX ADMIN — INDOMETHACIN 100 MG: 50 SUPPOSITORY RECTAL at 12:08

## 2023-12-12 RX ADMIN — PROPOFOL 100 MG: 10 INJECTION, EMULSION INTRAVENOUS at 11:37

## 2023-12-12 RX ADMIN — SODIUM CHLORIDE, PRESERVATIVE FREE 10 ML: 5 INJECTION INTRAVENOUS at 09:36

## 2023-12-12 RX ADMIN — ONDANSETRON HYDROCHLORIDE 4 MG: 2 INJECTION, SOLUTION INTRAMUSCULAR; INTRAVENOUS at 11:42

## 2023-12-12 RX ADMIN — TROSPIUM CHLORIDE 20 MG: 20 TABLET, FILM COATED ORAL at 16:16

## 2023-12-12 RX ADMIN — MIDAZOLAM HYDROCHLORIDE 2 MG: 1 INJECTION, SOLUTION INTRAMUSCULAR; INTRAVENOUS at 11:32

## 2023-12-12 RX ADMIN — IOTHALAMATE MEGLUMINE 20 ML: 600 INJECTION INTRAVASCULAR at 12:09

## 2023-12-12 RX ADMIN — METRONIDAZOLE 500 MG: 500 INJECTION, SOLUTION INTRAVENOUS at 23:01

## 2023-12-12 RX ADMIN — OXYCODONE AND ACETAMINOPHEN 1 TABLET: 5; 325 TABLET ORAL at 22:56

## 2023-12-12 RX ADMIN — FENTANYL CITRATE 50 MCG: 50 INJECTION, SOLUTION INTRAMUSCULAR; INTRAVENOUS at 11:34

## 2023-12-12 RX ADMIN — DEXAMETHASONE SODIUM PHOSPHATE 4 MG: 4 INJECTION, SOLUTION INTRA-ARTICULAR; INTRALESIONAL; INTRAMUSCULAR; INTRAVENOUS; SOFT TISSUE at 11:42

## 2023-12-12 RX ADMIN — SODIUM CHLORIDE, POTASSIUM CHLORIDE, SODIUM LACTATE AND CALCIUM CHLORIDE: 600; 310; 30; 20 INJECTION, SOLUTION INTRAVENOUS at 22:58

## 2023-12-12 RX ADMIN — SUCCINYLCHOLINE CHLORIDE 100 MG: 20 INJECTION, SOLUTION INTRAMUSCULAR; INTRAVENOUS at 11:37

## 2023-12-12 RX ADMIN — LIDOCAINE HYDROCHLORIDE 100 MG: 20 INJECTION, SOLUTION EPIDURAL; INFILTRATION; INTRACAUDAL; PERINEURAL at 11:34

## 2023-12-12 RX ADMIN — LEVOFLOXACIN 750 MG: 5 INJECTION, SOLUTION INTRAVENOUS at 16:19

## 2023-12-12 RX ADMIN — SODIUM CHLORIDE, POTASSIUM CHLORIDE, SODIUM LACTATE AND CALCIUM CHLORIDE: 600; 310; 30; 20 INJECTION, SOLUTION INTRAVENOUS at 13:22

## 2023-12-12 RX ADMIN — METRONIDAZOLE 500 MG: 500 INJECTION, SOLUTION INTRAVENOUS at 06:35

## 2023-12-12 RX ADMIN — SODIUM CHLORIDE: 9 INJECTION, SOLUTION INTRAVENOUS at 04:45

## 2023-12-12 ASSESSMENT — PAIN - FUNCTIONAL ASSESSMENT: PAIN_FUNCTIONAL_ASSESSMENT: 0-10

## 2023-12-12 ASSESSMENT — PAIN SCALES - GENERAL
PAINLEVEL_OUTOF10: 0
PAINLEVEL_OUTOF10: 0
PAINLEVEL_OUTOF10: 5
PAINLEVEL_OUTOF10: 0

## 2023-12-12 ASSESSMENT — PAIN DESCRIPTION - DESCRIPTORS: DESCRIPTORS: ACHING

## 2023-12-12 ASSESSMENT — PAIN DESCRIPTION - LOCATION: LOCATION: ABDOMEN;HEAD

## 2023-12-12 NOTE — ANESTHESIA PRE PROCEDURE
Department of Anesthesiology  Preprocedure Note       Name:  Mani Alvarado   Age:  79 y.o.  :  1955                                          MRN:  [id number]         Date:  2023      Surgeon: Jeffrey Bonilla):  Aspen Allan MD    Procedure: Procedure(s):  ERCP ENDOSCOPIC RETROGRADE CHOLANGIOPANCREATOGRAPHY    Medications prior to admission:   Prior to Admission medications    Medication Sig Start Date End Date Taking? Authorizing Provider   MYRBETRIQ 50 MG TB24  10/5/23   Julius Alcaraz MD   triamterene-hydroCHLOROthiazide (MAXZIDE-25) 37.5-25 MG per tablet  23   Julius Alcaraz MD   calcium carbonate 600 MG TABS tablet Take 1 tablet by mouth 2 times daily    Julius Alcaraz MD   Cholecalciferol (VITAMIN D3) 20 MCG (800 UNIT) TABS Take 20 mcg by mouth 2 times daily    Julius Alcaraz MD   ascorbic acid (QC VITAMIN C WITH ABRAHAN HIPS) 500 MG tablet Take 2 tablets by mouth daily Abrahan hip 8 mg    Julius Alcaraz MD   vitamin B-6 (PYRIDOXINE) 100 MG tablet Take 1 tablet by mouth daily    Julius Alcaraz MD   NONFORMULARY Take 36 mg by mouth daily Cran [place] Ultra?     ProviderJulius MD   zinc 50 MG TABS tablet Take 1 tablet by mouth daily    Julius Alcaraz MD   Ginger, Zingiber officinalis, (GINGER ROOT) 550 MG CAPS Take 1 capsule by mouth at bedtime    Julius Alcaraz MD   vitamin B-12 (CYANOCOBALAMIN) 1000 MCG tablet Take 1 tablet by mouth at bedtime    Julius Alcaraz MD   Cholecalciferol (VITAMIN D3) 125 MCG (5000 UT) TABS Take 1 tablet by mouth at bedtime    Julius Alcaraz MD   TURMERIC PO Take 1,000 mg by mouth at bedtime    Julius Alcaraz MD   ondansetron (ZOFRAN-ODT) 4 MG disintegrating tablet Take 1 tablet by mouth 3 times daily as needed for Nausea or Vomiting 11/10/23   Brett Ozuna MD       Current medications:    Current Facility-Administered Medications   Medication Dose Route Frequency Provider Last Rate Last

## 2023-12-12 NOTE — CARE COORDINATION
CM made attempt to contact pt's spouse Mansi Conn [phone] to complete CM readmission assessment was unsuccessful. Phone was not in service. Floor CM will follow up.      Sherryle Genet MSW    Ext [phone]

## 2023-12-12 NOTE — OP NOTE
balloon occlusion cholangiogram      Specimen Removed:  None    Complications: None. EBL:  None. Interventions:    Biliary:   Large sphincterotomy performed  Balloon sweeps performed with sludge removed. The CBD was confirmed clear with further balloon sweeps. A fully inflated 12 mm could easily be swept through ampulla    Impression:    Periampullary diverticulum x 2. This made for a somewhat difficult biliary cannulation per above, though successful   Ampulla suggestive of recent stone passage  Clips in RUQ consistent with cholecystectomy  Mild diffuse biliary dilation without definitive filling defects. Terminal CBD tapers nicely at ampulla WITHOUT strictures  NO bile leak even with balloon occlusion cholangiogram  Large sphincterotomy performed  Balloon sweeps performed with sludge removed. The CBD was confirmed clear with further balloon sweeps.  A fully inflated 12 mm could easily be swept through ampulla    Recommendations:    Can have clear liquid diet at 3 pm if no new abdominal pain  LR @250cc/hr (ordered)  Trend LFT's    Above personally discussed with patient's  Alecia      Discharge Disposition:  Back to floor following recovery in PACU    Linda Laura MD

## 2023-12-12 NOTE — CARE COORDINATION
Care Management Initial Assessment       RUR:14% Moderate Risk  Readmission? Yes   1st IM letter given? Yes   1st  letter given: N/A      Initial Assessment: Chart reviewed. CM met with pt at the bedside to introduce self and role. Verified contact information and demographics. Pt resides with spouse (Hardik Torres [phone])  in a two level home with 3 NADIA. Pt PCP is Dr. Alysha Whitehead with last visit being in October of 2023. [facility] [place], [place]. Pt has no hx needing HH/IPR/SNF services. Pt independent with ADL's. Pt is an active  and spouse will pick her up for discharge. ?She?states there is no concerns for her to return home. CM will continue to follow.   Full assessment below:            12/12/23 7396   Service Assessment   Patient Orientation Alert and Oriented;Person;Place;Situation;Self   Cognition Alert   History Provided By Patient   Primary Caregiver Self   Support Systems Spouse/Significant Other   Patient's Healthcare Decision Maker is: Legal Next of [address] [place] [address]  ([address] (Spouse)  [phone])   PCP Verified by CM Yes   Last Visit to PCP Within last 3 months   Prior Functional Level Independent in ADLs/IADLs   Current Functional Level Independent in ADLs/IADLs   Can patient return to prior living arrangement Yes   Social/Functional History   Lives With Spouse   Type of [address] [place] [address] Two level   Home Equipment None   Active  Yes   Discharge Planning   Type of Residence House   Current Services Prior To Admission None   Patient expects to be discharged to: House       Readmission Assessment  Number of Days since last admission?: 8-30 days  Previous Disposition: Home with Family  Who is being Interviewed: Patient  What was the patient's/caregiver's perception as to why they think they needed to return back to the hospital?: Other (Comment) (Pt returned to hospital due to back pain)  Did you visit your Primary Care Physician after you left

## 2023-12-12 NOTE — ANESTHESIA POSTPROCEDURE EVALUATION
Department of Anesthesiology  Postprocedure Note    Patient: Mihir Leger  MRN: [id number]  [address] [facility] 1955  Date of evaluation: 12/12/2023      Procedure Summary       Date: 12/12/23 Room / Location: [facility] / [facility] ENDOSCOPY    Anesthesia Start: 1132 Anesthesia Stop: 4469    Procedure: ERCP ENDOSCOPIC RETROGRADE CHOLANGIOPANCREATOGRAPHY Diagnosis:       Gastrointestinal tract imaging abnormality      (Gastrointestinal tract imaging abnormality [R93.3])    Surgeons: Chata Hess MD Responsible Provider: Slim Nicole MD    Anesthesia Type: General ASA Status: 2            Anesthesia Type: General    Mina Phase I: Mina Score: 10    Mina Phase II:        Anesthesia Post Evaluation    Patient location during evaluation: PACU  Patient participation: complete - patient participated  Level of consciousness: sleepy but conscious and responsive to verbal stimuli  Pain score: 2  Airway patency: patent  Nausea & Vomiting: no vomiting and no nausea  Complications: no  Cardiovascular status: blood pressure returned to baseline and hemodynamically stable  Respiratory status: acceptable  Hydration status: stable  Multimodal analgesia pain management approach  Pain management: adequate

## 2023-12-13 VITALS
DIASTOLIC BLOOD PRESSURE: 80 MMHG | BODY MASS INDEX: 23.54 KG/M2 | HEART RATE: 90 BPM | WEIGHT: 150 LBS | OXYGEN SATURATION: 98 % | HEIGHT: 67 IN | TEMPERATURE: 97.9 F | RESPIRATION RATE: 16 BRPM | SYSTOLIC BLOOD PRESSURE: 135 MMHG

## 2023-12-13 LAB
ALBUMIN SERPL-MCNC: 2.3 G/DL (ref 3.5–5)
ALBUMIN SERPL-MCNC: 2.3 G/DL (ref 3.5–5)
ALBUMIN/GLOB SERPL: 0.7 (ref 1.1–2.2)
ALBUMIN/GLOB SERPL: 0.7 (ref 1.1–2.2)
ALP SERPL-CCNC: 141 U/L (ref 45–117)
ALP SERPL-CCNC: 143 U/L (ref 45–117)
ALT SERPL-CCNC: 348 U/L (ref 12–78)
ALT SERPL-CCNC: 348 U/L (ref 12–78)
ANION GAP SERPL CALC-SCNC: 7 MMOL/L (ref 5–15)
AST SERPL-CCNC: 132 U/L (ref 15–37)
AST SERPL-CCNC: 138 U/L (ref 15–37)
BASOPHILS # BLD: 0 K/UL (ref 0–0.1)
BASOPHILS NFR BLD: 0 % (ref 0–1)
BILIRUB DIRECT SERPL-MCNC: 0.8 MG/DL (ref 0–0.2)
BILIRUB SERPL-MCNC: 1.3 MG/DL (ref 0.2–1)
BILIRUB SERPL-MCNC: 1.5 MG/DL (ref 0.2–1)
BUN SERPL-MCNC: 11 MG/DL (ref 6–20)
BUN/CREAT SERPL: 15 (ref 12–20)
CALCIUM SERPL-MCNC: 8.8 MG/DL (ref 8.5–10.1)
CHLORIDE SERPL-SCNC: 110 MMOL/L (ref 97–108)
CO2 SERPL-SCNC: 22 MMOL/L (ref 21–32)
CREAT SERPL-MCNC: 0.75 MG/DL (ref 0.55–1.02)
DIFFERENTIAL METHOD BLD: ABNORMAL
EOSINOPHIL # BLD: 0 K/UL (ref 0–0.4)
EOSINOPHIL NFR BLD: 0 % (ref 0–7)
ERYTHROCYTE [DISTWIDTH] IN BLOOD BY AUTOMATED COUNT: 13.8 % (ref 11.5–14.5)
GLOBULIN SER CALC-MCNC: 3.3 G/DL (ref 2–4)
GLOBULIN SER CALC-MCNC: 3.4 G/DL (ref 2–4)
GLUCOSE BLD STRIP.AUTO-MCNC: 181 MG/DL (ref 65–117)
GLUCOSE SERPL-MCNC: 140 MG/DL (ref 65–100)
HCT VFR BLD AUTO: 33.1 % (ref 35–47)
HGB BLD-MCNC: 10.8 G/DL (ref 11.5–16)
IMM GRANULOCYTES # BLD AUTO: 0 K/UL (ref 0–0.04)
IMM GRANULOCYTES NFR BLD AUTO: 1 % (ref 0–0.5)
LYMPHOCYTES # BLD: 0.4 K/UL (ref 0.8–3.5)
LYMPHOCYTES NFR BLD: 16 % (ref 12–49)
MCH RBC QN AUTO: 29.8 PG (ref 26–34)
MCHC RBC AUTO-ENTMCNC: 32.6 G/DL (ref 30–36.5)
MCV RBC AUTO: 91.4 FL (ref 80–99)
MONOCYTES # BLD: 0.3 K/UL (ref 0–1)
MONOCYTES NFR BLD: 10 % (ref 5–13)
NEUTS SEG # BLD: 1.9 K/UL (ref 1.8–8)
NEUTS SEG NFR BLD: 74 % (ref 32–75)
NRBC # BLD: 0 K/UL (ref 0–0.01)
NRBC BLD-RTO: 0 PER 100 WBC
PLATELET # BLD AUTO: 187 K/UL (ref 150–400)
PMV BLD AUTO: 10.1 FL (ref 8.9–12.9)
POTASSIUM SERPL-SCNC: 4.1 MMOL/L (ref 3.5–5.1)
PROT SERPL-MCNC: 5.6 G/DL (ref 6.4–8.2)
PROT SERPL-MCNC: 5.7 G/DL (ref 6.4–8.2)
RBC # BLD AUTO: 3.62 M/UL (ref 3.8–5.2)
SERVICE CMNT-IMP: ABNORMAL
SODIUM SERPL-SCNC: 139 MMOL/L (ref 136–145)
WBC # BLD AUTO: 2.6 K/UL (ref 3.6–11)

## 2023-12-13 PROCEDURE — 6370000000 HC RX 637 (ALT 250 FOR IP): Performed by: NURSE PRACTITIONER

## 2023-12-13 PROCEDURE — 82962 GLUCOSE BLOOD TEST: CPT

## 2023-12-13 PROCEDURE — 36415 COLL VENOUS BLD VENIPUNCTURE: CPT

## 2023-12-13 PROCEDURE — 80053 COMPREHEN METABOLIC PANEL: CPT

## 2023-12-13 PROCEDURE — 85025 COMPLETE CBC W/AUTO DIFF WBC: CPT

## 2023-12-13 PROCEDURE — 2580000003 HC RX 258: Performed by: INTERNAL MEDICINE

## 2023-12-13 PROCEDURE — 6360000002 HC RX W HCPCS

## 2023-12-13 PROCEDURE — 80076 HEPATIC FUNCTION PANEL: CPT

## 2023-12-13 RX ORDER — LEVOFLOXACIN 5 MG/ML
750 INJECTION, SOLUTION INTRAVENOUS EVERY 24 HOURS
Qty: 750 ML | Refills: 0 | Status: SHIPPED | OUTPATIENT
Start: 2023-12-13 | End: 2023-12-13 | Stop reason: HOSPADM

## 2023-12-13 RX ORDER — METRONIDAZOLE 500 MG/1
500 TABLET ORAL 2 TIMES DAILY
Qty: 10 TABLET | Refills: 0 | Status: SHIPPED | OUTPATIENT
Start: 2023-12-13 | End: 2023-12-18

## 2023-12-13 RX ORDER — LEVOFLOXACIN 750 MG/1
750 TABLET, FILM COATED ORAL DAILY
Qty: 5 TABLET | Refills: 0 | Status: SHIPPED | OUTPATIENT
Start: 2023-12-13 | End: 2023-12-18

## 2023-12-13 RX ADMIN — TROSPIUM CHLORIDE 20 MG: 20 TABLET, FILM COATED ORAL at 08:09

## 2023-12-13 RX ADMIN — METRONIDAZOLE 500 MG: 500 INJECTION, SOLUTION INTRAVENOUS at 06:40

## 2023-12-13 RX ADMIN — SODIUM CHLORIDE, POTASSIUM CHLORIDE, SODIUM LACTATE AND CALCIUM CHLORIDE: 600; 310; 30; 20 INJECTION, SOLUTION INTRAVENOUS at 03:47

## 2023-12-13 NOTE — DISCHARGE SUMMARY
Discharge Summary    Name: Nela Pryor  [id number]  YOB: 1955 (Age: 79 y.o.)   Date of Admission: 12/11/2023  Date of Discharge: 12/13/2023  Attending Physician: Osvaldo Narayan MD    Discharge Diagnosis:     Consultations:  IP CONSULT TO GI      Brief Admission History/Reason for Admission Per Darlene Briscoe MD:       [address] Course by Main Problems:   Right flank pain        Distal common bile duct stone        Transaminitis         Elevated bilirubin  Blood culture negative today  - procalcitonin 0.99, lactic 0.7 12/11/23  Chest x-ray is within normal limit  - urinalysis on 12/11/23 shows: trace leukocytes, many epithelial cells, +1 bacteria, no reflex to culture. - CT of abdomen/pelvis on 12/11/23 shows:  Mild intrahepatic and extrahepatic biliary dilatation with a distal common bile duct stone  Minimal fluid in the cholecystectomy bed     Status post ERCP today  Impression:    Periampullary diverticulum x 2. This made for a somewhat difficult biliary cannulation per above, though successful   Ampulla suggestive of recent stone passage  Clips in RUQ consistent with cholecystectomy  Mild diffuse biliary dilation without definitive filling defects. Terminal CBD tapers nicely at ampulla WITHOUT strictures  NO bile leak even with balloon occlusion cholangiogram  Large sphincterotomy performed  Balloon sweeps performed with sludge removed. The CBD was confirmed clear with further balloon sweeps. A fully inflated 12 mm could easily be swept through ampulla     Recommendations:    Can have clear liquid diet at 3 pm if no new abdominal pain  LR @250cc/hr (ordered)  Trend LFT's     - continue levaquin and flagyl  - monitor lab work  -    2. History of overactive bladder  Continue with sanctura 20mg po bid; pharmacy substitution for myrbetriq 50mg by mouth daily. - strict I&O     3.  History of HTN  - resume home blood pressure medication on 12/12/23 if blood

## 2023-12-13 NOTE — DISCHARGE INSTRUCTIONS
DISCHARGE DIAGNOSIS:  Right flank pain        Distal common bile duct stone        Transaminitis         Elevated bilirubin  Status post ERCP   2. History of overactive bladder  3. History of HTN  4. Hypokalemia      MEDICATIONS:  It is important that you take the medication exactly as they are prescribed. Keep your medication in the bottles provided by the pharmacist and keep a list of the medication names, dosages, and times to be taken in your wallet. Do not take other medications without consulting your doctor. Pain Management: per above medications    What to do at Home    Recommended diet:  low fat, low cholesterol diet    Recommended activity: activity as tolerated    If you have questions regarding the hospital related prescriptions or hospital related issues please call [facility] at . You can always direct your questions to your primary care doctor if you are unable to reach your hospital physician; your PCP works as an extension of your hospital doctor just like your hospital doctor is an extension of your PCP for your time at the hospital [facility]).     If you experience any of the following symptoms then please call your primary care physician or return to the emergency room if you cannot get hold of your doctor:  Fever, chills, nausea, vomiting, diarrhea, change in mentation, falling, bleeding, shortness of breath,

## 2023-12-17 LAB
BACTERIA SPEC CULT: NORMAL
BACTERIA SPEC CULT: NORMAL
SERVICE CMNT-IMP: NORMAL
SERVICE CMNT-IMP: NORMAL

## 2024-01-11 ENCOUNTER — OFFICE VISIT (OUTPATIENT)
Facility: CLINIC | Age: 69
End: 2024-01-11

## 2024-01-11 VITALS
HEIGHT: 67 IN | WEIGHT: 152 LBS | SYSTOLIC BLOOD PRESSURE: 162 MMHG | DIASTOLIC BLOOD PRESSURE: 83 MMHG | RESPIRATION RATE: 16 BRPM | HEART RATE: 83 BPM | BODY MASS INDEX: 23.86 KG/M2 | OXYGEN SATURATION: 98 % | TEMPERATURE: 97.8 F

## 2024-01-11 DIAGNOSIS — Z91.81 AT HIGH RISK FOR FALLS: ICD-10-CM

## 2024-01-11 DIAGNOSIS — Z90.49 S/P LAPAROSCOPIC CHOLECYSTECTOMY: ICD-10-CM

## 2024-01-11 DIAGNOSIS — Z85.3 HX OF BREAST CANCER: ICD-10-CM

## 2024-01-11 DIAGNOSIS — Z76.89 ENCOUNTER TO ESTABLISH CARE WITH NEW DOCTOR: Primary | ICD-10-CM

## 2024-01-11 SDOH — HEALTH STABILITY: PHYSICAL HEALTH: ON AVERAGE, HOW MANY DAYS PER WEEK DO YOU ENGAGE IN MODERATE TO STRENUOUS EXERCISE (LIKE A BRISK WALK)?: 7 DAYS

## 2024-01-11 SDOH — HEALTH STABILITY: PHYSICAL HEALTH: ON AVERAGE, HOW MANY MINUTES DO YOU ENGAGE IN EXERCISE AT THIS LEVEL?: 150+ MIN

## 2024-01-11 ASSESSMENT — PATIENT HEALTH QUESTIONNAIRE - PHQ9
SUM OF ALL RESPONSES TO PHQ QUESTIONS 1-9: 0
2. FEELING DOWN, DEPRESSED OR HOPELESS: 0
SUM OF ALL RESPONSES TO PHQ9 QUESTIONS 1 & 2: 0
1. LITTLE INTEREST OR PLEASURE IN DOING THINGS: 0
SUM OF ALL RESPONSES TO PHQ QUESTIONS 1-9: 0

## 2024-01-11 NOTE — PATIENT INSTRUCTIONS
It was a pleasure to see you today.    1. Encounter to establish care with new doctor    2. Hx of breast cancer    3. S/P laparoscopic cholecystectomy    4. At high risk for falls         No follow-ups on file.     Thank you for choosing [facility].    EDU Royal - NP

## 2024-01-11 NOTE — PROGRESS NOTES
Chief Complaint   Patient presents with    Establish Care     History breast cancer in 2012 - previous PCP at [facility] - Dr Servin current oncologist - would like a new [facility] oncologist

## 2024-01-18 LAB
ALBUMIN SERPL-MCNC: 4.3 G/DL (ref 3.9–4.9)
ALBUMIN/GLOB SERPL: 1.9 {RATIO} (ref 1.2–2.2)
ALP SERPL-CCNC: 99 IU/L (ref 44–121)
ALT SERPL-CCNC: 18 IU/L (ref 0–32)
APPEARANCE UR: CLEAR
AST SERPL-CCNC: 23 IU/L (ref 0–40)
BASOPHILS # BLD AUTO: 0 X10E3/UL (ref 0–0.2)
BASOPHILS NFR BLD AUTO: 1 %
BILIRUB SERPL-MCNC: 0.6 MG/DL (ref 0–1.2)
BILIRUB UR QL STRIP: NEGATIVE
BUN SERPL-MCNC: 14 MG/DL (ref 8–27)
BUN/CREAT SERPL: 17 (ref 12–28)
CALCIUM SERPL-MCNC: 9.9 MG/DL (ref 8.7–10.3)
CHLORIDE SERPL-SCNC: 104 MMOL/L (ref 96–106)
CHOLEST SERPL-MCNC: 230 MG/DL (ref 100–199)
CO2 SERPL-SCNC: 24 MMOL/L (ref 20–29)
COLOR UR: YELLOW
CREAT SERPL-MCNC: 0.83 MG/DL (ref 0.57–1)
EGFRCR SERPLBLD CKD-EPI 2021: 77 ML/MIN/1.73
EOSINOPHIL # BLD AUTO: 0.1 X10E3/UL (ref 0–0.4)
EOSINOPHIL NFR BLD AUTO: 2 %
ERYTHROCYTE [DISTWIDTH] IN BLOOD BY AUTOMATED COUNT: 13.7 % (ref 11.7–15.4)
GLOBULIN SER CALC-MCNC: 2.3 G/DL (ref 1.5–4.5)
GLUCOSE SERPL-MCNC: 89 MG/DL (ref 70–99)
GLUCOSE UR QL STRIP: NEGATIVE
HBA1C MFR BLD: 5.8 % (ref 4.8–5.6)
HCT VFR BLD AUTO: 40.4 % (ref 34–46.6)
HDLC SERPL-MCNC: 51 MG/DL
HGB BLD-MCNC: 13.4 G/DL (ref 11.1–15.9)
HGB UR QL STRIP: NEGATIVE
IMM GRANULOCYTES # BLD AUTO: 0 X10E3/UL (ref 0–0.1)
IMM GRANULOCYTES NFR BLD AUTO: 0 %
IMP & REVIEW OF LAB RESULTS: NORMAL
KETONES UR QL STRIP: NEGATIVE
LDLC SERPL CALC-MCNC: 150 MG/DL (ref 0–99)
LEUKOCYTE ESTERASE UR QL STRIP: ABNORMAL
LYMPHOCYTES # BLD AUTO: 2.6 X10E3/UL (ref 0.7–3.1)
LYMPHOCYTES NFR BLD AUTO: 45 %
MCH RBC QN AUTO: 29.8 PG (ref 26.6–33)
MCHC RBC AUTO-ENTMCNC: 33.2 G/DL (ref 31.5–35.7)
MCV RBC AUTO: 90 FL (ref 79–97)
MONOCYTES # BLD AUTO: 0.5 X10E3/UL (ref 0.1–0.9)
MONOCYTES NFR BLD AUTO: 8 %
NEUTROPHILS # BLD AUTO: 2.5 X10E3/UL (ref 1.4–7)
NEUTROPHILS NFR BLD AUTO: 44 %
NITRITE UR QL STRIP: NEGATIVE
PH UR STRIP: 6.5 [PH] (ref 5–7.5)
PLATELET # BLD AUTO: 314 X10E3/UL (ref 150–450)
POTASSIUM SERPL-SCNC: 4.3 MMOL/L (ref 3.5–5.2)
PROT SERPL-MCNC: 6.6 G/DL (ref 6–8.5)
PROT UR QL STRIP: NEGATIVE
RBC # BLD AUTO: 4.5 X10E6/UL (ref 3.77–5.28)
SODIUM SERPL-SCNC: 143 MMOL/L (ref 134–144)
SP GR UR STRIP: 1.01 (ref 1–1.03)
TRIGL SERPL-MCNC: 159 MG/DL (ref 0–149)
TSH SERPL DL<=0.005 MIU/L-ACNC: 4.45 UIU/ML (ref 0.45–4.5)
UROBILINOGEN UR STRIP-MCNC: 0.2 MG/DL (ref 0.2–1)
VLDLC SERPL CALC-MCNC: 29 MG/DL (ref 5–40)
WBC # BLD AUTO: 5.7 X10E3/UL (ref 3.4–10.8)

## 2024-02-08 ENCOUNTER — OFFICE VISIT (OUTPATIENT)
Facility: CLINIC | Age: 69
End: 2024-02-08
Payer: MEDICARE

## 2024-02-08 ENCOUNTER — TELEPHONE (OUTPATIENT)
Age: 69
End: 2024-02-08

## 2024-02-08 VITALS
SYSTOLIC BLOOD PRESSURE: 130 MMHG | WEIGHT: 150 LBS | BODY MASS INDEX: 23.54 KG/M2 | HEART RATE: 75 BPM | RESPIRATION RATE: 16 BRPM | HEIGHT: 67 IN | OXYGEN SATURATION: 95 % | DIASTOLIC BLOOD PRESSURE: 80 MMHG | TEMPERATURE: 97.5 F

## 2024-02-08 DIAGNOSIS — E78.5 HYPERLIPIDEMIA, UNSPECIFIED HYPERLIPIDEMIA TYPE: ICD-10-CM

## 2024-02-08 DIAGNOSIS — R73.03 PREDIABETES: ICD-10-CM

## 2024-02-08 DIAGNOSIS — Z85.3 HX OF BREAST CANCER: ICD-10-CM

## 2024-02-08 PROBLEM — M81.0 OSTEOPOROSIS: Status: ACTIVE | Noted: 2024-02-08

## 2024-02-08 PROCEDURE — G8400 PT W/DXA NO RESULTS DOC: HCPCS | Performed by: NURSE PRACTITIONER

## 2024-02-08 PROCEDURE — 1090F PRES/ABSN URINE INCON ASSESS: CPT | Performed by: NURSE PRACTITIONER

## 2024-02-08 PROCEDURE — G8420 CALC BMI NORM PARAMETERS: HCPCS | Performed by: NURSE PRACTITIONER

## 2024-02-08 PROCEDURE — G8428 CUR MEDS NOT DOCUMENT: HCPCS | Performed by: NURSE PRACTITIONER

## 2024-02-08 PROCEDURE — 1036F TOBACCO NON-USER: CPT | Performed by: NURSE PRACTITIONER

## 2024-02-08 PROCEDURE — 3017F COLORECTAL CA SCREEN DOC REV: CPT | Performed by: NURSE PRACTITIONER

## 2024-02-08 PROCEDURE — 1123F ACP DISCUSS/DSCN MKR DOCD: CPT | Performed by: NURSE PRACTITIONER

## 2024-02-08 PROCEDURE — 99214 OFFICE O/P EST MOD 30 MIN: CPT | Performed by: NURSE PRACTITIONER

## 2024-02-08 PROCEDURE — G8484 FLU IMMUNIZE NO ADMIN: HCPCS | Performed by: NURSE PRACTITIONER

## 2024-02-08 RX ORDER — ATORVASTATIN CALCIUM 10 MG/1
10 TABLET, FILM COATED ORAL DAILY
Qty: 30 TABLET | Refills: 3 | Status: SHIPPED | OUTPATIENT
Start: 2024-02-08

## 2024-02-08 ASSESSMENT — PATIENT HEALTH QUESTIONNAIRE - PHQ9
SUM OF ALL RESPONSES TO PHQ QUESTIONS 1-9: 0
SUM OF ALL RESPONSES TO PHQ9 QUESTIONS 1 & 2: 0
1. LITTLE INTEREST OR PLEASURE IN DOING THINGS: 0
2. FEELING DOWN, DEPRESSED OR HOPELESS: 0
SUM OF ALL RESPONSES TO PHQ QUESTIONS 1-9: 0

## 2024-02-08 ASSESSMENT — ENCOUNTER SYMPTOMS
COUGH: 0
CHEST TIGHTNESS: 0
CONSTIPATION: 0
ABDOMINAL PAIN: 0
NAUSEA: 0
WHEEZING: 0
DIARRHEA: 0
SHORTNESS OF BREATH: 0

## 2024-02-08 NOTE — TELEPHONE ENCOUNTER
DEXTER to set up Oncology Consult    PER TEAM  6 months.  She saw Dr. Servin in Jan 2024.      NP/Hx of breast cancer/Mary Lopez APRN - NP

## 2024-02-08 NOTE — PATIENT INSTRUCTIONS
do 2½ hours (150 minutes) of moderate activity a week. A faster heartbeat is what defines moderate-level activity.  To stay motivated, walk with friends, coworkers, or pets.  Use a phone shaquille, pedometer, or wearable device to track your steps each day. Set a goal to increase your steps. When you reach that goal, set a higher goal.  If the weather keeps you from walking outside, go for walks at the mall with a friend. Local schools and churches may have indoor gyms where you can walk.  Fitting a walk into your workday  Park several blocks away from work, or get off the bus a few stops early.  Use the stairs instead of the elevator, at least for a few floors.  Suggest holding meetings with colleagues during a walk inside or outside the building.  Use the restroom that is the farthest from your desk or workstation.  Use your morning and afternoon breaks to take quick 15 minutes walks.  Staying safe  Know your surroundings. Walk in a well-lighted, safe place. If it's dark, walk with a partner. Wear light-colored clothing. If you can, buy a vest or jacket that reflects light.  Carry a cell phone for emergencies.  Drink plenty of water. Take a water bottle with you when you walk. This is very important if it is hot out.  Be careful not to slip on wet or icy ground. You can buy \"grippers\" for your shoes to help keep you from slipping.  Pay attention to your walking surface. Use sidewalks and paths.  If you have health issues such as asthma, COPD, or heart problems, or if you haven't been active for a long time, check with your doctor before you start a new activity.  Where can you learn more?  Go to https://www.CSR.net/patientEd and enter R159 to learn more about \"Walking for Exercise: Care Instructions.\"  Current as of: June 6, 2023               Content Version: 13.9  © 7570-2552 Healthwise, Inc[employer].   Care instructions adapted under license by [employer]. If you have questions about a medical condition or

## 2024-02-08 NOTE — PROGRESS NOTES
Chief Complaint   Patient presents with    Discuss Labs           \"Have you been to the ER, urgent care clinic since your last visit?  Hospitalized since your last visit?\"    NO    “Have you seen or consulted any other health care providers outside of [facility] since your last visit?”    NO    “Have you had a colorectal cancer screening such as a colonoscopy/FIT/Cologuard?    NO     Have you had a mammogram?”   YES - Where: [facility]                   
(5000 UT) TABS Take 1 tablet by mouth at bedtime      TURMERIC PO Take 500 mg by mouth at bedtime       No current facility-administered medications for this visit.        Review of Systems   Constitutional:  Negative for activity change, fatigue, fever and unexpected weight change.   HENT:  Negative for nosebleeds.    Eyes:  Negative for visual disturbance.   Respiratory:  Negative for cough, chest tightness, shortness of breath and wheezing.    Cardiovascular:  Negative for chest pain, palpitations and leg swelling.   Gastrointestinal:  Negative for abdominal pain, constipation, diarrhea and nausea.   Endocrine: Negative for polydipsia and polyuria.   Genitourinary:  Negative for flank pain.   Musculoskeletal:  Negative for joint swelling and myalgias.   Skin:  Negative for rash.   Neurological:  Negative for dizziness, syncope, weakness, light-headedness, numbness and headaches.   Psychiatric/Behavioral:  The patient is not nervous/anxious.           Objective   Physical Exam  Vitals reviewed.   Constitutional:       General: She is not in acute distress.     Appearance: Normal appearance.   HENT:      Head: Normocephalic.      Nose: Nose normal.      Mouth/Throat:      Mouth: Mucous membranes are moist.   Eyes:      Extraocular Movements: Extraocular movements intact.      Conjunctiva/sclera: Conjunctivae normal.      Pupils: Pupils are equal, round, and reactive to light.   Cardiovascular:      Rate and Rhythm: Normal rate and regular rhythm.      Pulses: Normal pulses.      Heart sounds: Normal heart sounds.   Pulmonary:      Effort: Pulmonary effort is normal.      Breath sounds: Normal breath sounds.   Abdominal:      General: Bowel sounds are normal.   Musculoskeletal:         General: Normal range of motion.      Cervical back: Normal range of motion.   Skin:     General: Skin is warm and dry.      Findings: No rash.   Neurological:      Mental Status: She is alert.   Psychiatric:         Mood and Affect:

## 2024-02-12 DIAGNOSIS — Z85.3 HX OF BREAST CANCER: Primary | ICD-10-CM

## 2024-02-12 NOTE — TELEPHONE ENCOUNTER
Patient refill request; [facility] pharmacy        MYRBETRIQ 50 MG TB24  triamterene-hydroCHLOROthiazide (MAXZIDE-25) 37.5-25 MG per tablet        She also would like to know the status on the bras Mary ordered for her through [employer]. Please call patient to advise

## 2024-02-13 RX ORDER — MIRABEGRON 50 MG/1
50 TABLET, FILM COATED, EXTENDED RELEASE ORAL DAILY
Qty: 90 TABLET | Refills: 1 | Status: SHIPPED | OUTPATIENT
Start: 2024-02-13

## 2024-02-13 RX ORDER — TRIAMTERENE AND HYDROCHLOROTHIAZIDE 37.5; 25 MG/1; MG/1
1 TABLET ORAL DAILY
Qty: 90 TABLET | Refills: 1 | Status: SHIPPED | OUTPATIENT
Start: 2024-02-13

## 2024-02-15 ENCOUNTER — CLINICAL DOCUMENTATION (OUTPATIENT)
Facility: CLINIC | Age: 69
End: 2024-02-15

## 2024-02-15 NOTE — PROGRESS NOTES
Chief Complaint   Patient presents with    Orders     Faxed order for bras & prosthesis to [facility] in [place] at [phone] with demographics - they will reach out to patient for a fitting  Angelina Watts LPN 2/15/2024 3:17 PM

## 2024-07-31 LAB
ALBUMIN SERPL-MCNC: 4.4 G/DL (ref 3.9–4.9)
ALP SERPL-CCNC: 114 IU/L (ref 44–121)
ALT SERPL-CCNC: 11 IU/L (ref 0–32)
APPEARANCE UR: ABNORMAL
AST SERPL-CCNC: 17 IU/L (ref 0–40)
BACTERIA #/AREA URNS HPF: ABNORMAL /[HPF]
BASOPHILS # BLD AUTO: 0 X10E3/UL (ref 0–0.2)
BASOPHILS NFR BLD AUTO: 1 %
BILIRUB SERPL-MCNC: 0.6 MG/DL (ref 0–1.2)
BILIRUB UR QL STRIP: NEGATIVE
BUN SERPL-MCNC: 10 MG/DL (ref 8–27)
BUN/CREAT SERPL: 14 (ref 12–28)
CALCIUM SERPL-MCNC: 9.7 MG/DL (ref 8.7–10.3)
CASTS URNS QL MICRO: ABNORMAL /LPF
CHLORIDE SERPL-SCNC: 96 MMOL/L (ref 96–106)
CHOLEST SERPL-MCNC: 224 MG/DL (ref 100–199)
CO2 SERPL-SCNC: 27 MMOL/L (ref 20–29)
COLOR UR: YELLOW
CREAT SERPL-MCNC: 0.74 MG/DL (ref 0.57–1)
EGFRCR SERPLBLD CKD-EPI 2021: 89 ML/MIN/1.73
EOSINOPHIL # BLD AUTO: 0.1 X10E3/UL (ref 0–0.4)
EOSINOPHIL NFR BLD AUTO: 1 %
EPI CELLS #/AREA URNS HPF: >10 /HPF (ref 0–10)
ERYTHROCYTE [DISTWIDTH] IN BLOOD BY AUTOMATED COUNT: 12.1 % (ref 11.7–15.4)
GLOBULIN SER CALC-MCNC: 2.7 G/DL (ref 1.5–4.5)
GLUCOSE SERPL-MCNC: 105 MG/DL (ref 70–99)
GLUCOSE UR QL STRIP: NEGATIVE
HBA1C MFR BLD: 5.7 % (ref 4.8–5.6)
HCT VFR BLD AUTO: 40.2 % (ref 34–46.6)
HDLC SERPL-MCNC: 58 MG/DL
HGB BLD-MCNC: 13.6 G/DL (ref 11.1–15.9)
HGB UR QL STRIP: NEGATIVE
IMM GRANULOCYTES # BLD AUTO: 0 X10E3/UL (ref 0–0.1)
IMM GRANULOCYTES NFR BLD AUTO: 0 %
IMP & REVIEW OF LAB RESULTS: NORMAL
KETONES UR QL STRIP: NEGATIVE
LDLC SERPL CALC-MCNC: 146 MG/DL (ref 0–99)
LEUKOCYTE ESTERASE UR QL STRIP: ABNORMAL
LYMPHOCYTES # BLD AUTO: 2.2 X10E3/UL (ref 0.7–3.1)
LYMPHOCYTES NFR BLD AUTO: 30 %
MCH RBC QN AUTO: 30.2 PG (ref 26.6–33)
MCHC RBC AUTO-ENTMCNC: 33.8 G/DL (ref 31.5–35.7)
MCV RBC AUTO: 89 FL (ref 79–97)
MICRO URNS: ABNORMAL
MONOCYTES # BLD AUTO: 0.6 X10E3/UL (ref 0.1–0.9)
MONOCYTES NFR BLD AUTO: 8 %
NEUTROPHILS # BLD AUTO: 4.4 X10E3/UL (ref 1.4–7)
NEUTROPHILS NFR BLD AUTO: 60 %
NITRITE UR QL STRIP: POSITIVE
PH UR STRIP: 6 [PH] (ref 5–7.5)
PLATELET # BLD AUTO: 332 X10E3/UL (ref 150–450)
POTASSIUM SERPL-SCNC: 4.2 MMOL/L (ref 3.5–5.2)
PROT SERPL-MCNC: 7.1 G/DL (ref 6–8.5)
PROT UR QL STRIP: NEGATIVE
RBC # BLD AUTO: 4.5 X10E6/UL (ref 3.77–5.28)
RBC #/AREA URNS HPF: ABNORMAL /HPF (ref 0–2)
SODIUM SERPL-SCNC: 139 MMOL/L (ref 134–144)
SP GR UR STRIP: 1.01 (ref 1–1.03)
TRIGL SERPL-MCNC: 111 MG/DL (ref 0–149)
TSH SERPL DL<=0.005 MIU/L-ACNC: 5.08 UIU/ML (ref 0.45–4.5)
UROBILINOGEN UR STRIP-MCNC: 0.2 MG/DL (ref 0.2–1)
VLDLC SERPL CALC-MCNC: 20 MG/DL (ref 5–40)
WBC # BLD AUTO: 7.3 X10E3/UL (ref 3.4–10.8)
WBC #/AREA URNS HPF: ABNORMAL /HPF (ref 0–5)

## 2024-08-01 ENCOUNTER — TELEMEDICINE (OUTPATIENT)
Facility: CLINIC | Age: 69
End: 2024-08-01
Payer: MEDICARE

## 2024-08-01 ENCOUNTER — TELEPHONE (OUTPATIENT)
Facility: CLINIC | Age: 69
End: 2024-08-01

## 2024-08-01 DIAGNOSIS — R79.89 ELEVATED TSH: Primary | ICD-10-CM

## 2024-08-01 DIAGNOSIS — N39.0 URINARY TRACT INFECTION WITHOUT HEMATURIA, SITE UNSPECIFIED: ICD-10-CM

## 2024-08-01 PROCEDURE — 99447 NTRPROF PH1/NTRNET/EHR 11-20: CPT | Performed by: NURSE PRACTITIONER

## 2024-08-01 RX ORDER — LEVOTHYROXINE SODIUM 0.03 MG/1
25 TABLET ORAL DAILY
Qty: 90 TABLET | Refills: 1 | Status: SHIPPED | OUTPATIENT
Start: 2024-08-01

## 2024-08-01 RX ORDER — NITROFURANTOIN 25; 75 MG/1; MG/1
100 CAPSULE ORAL 2 TIMES DAILY
Qty: 20 CAPSULE | Refills: 0 | Status: SHIPPED | OUTPATIENT
Start: 2024-08-01 | End: 2024-08-11

## 2024-08-01 SDOH — ECONOMIC STABILITY: INCOME INSECURITY: HOW HARD IS IT FOR YOU TO PAY FOR THE VERY BASICS LIKE FOOD, HOUSING, MEDICAL CARE, AND HEATING?: PATIENT DECLINED

## 2024-08-01 SDOH — ECONOMIC STABILITY: HOUSING INSECURITY
IN THE LAST 12 MONTHS, WAS THERE A TIME WHEN YOU DID NOT HAVE A STEADY PLACE TO SLEEP OR SLEPT IN A SHELTER (INCLUDING NOW)?: NO

## 2024-08-01 SDOH — ECONOMIC STABILITY: FOOD INSECURITY: WITHIN THE PAST 12 MONTHS, YOU WORRIED THAT YOUR FOOD WOULD RUN OUT BEFORE YOU GOT MONEY TO BUY MORE.: PATIENT DECLINED

## 2024-08-01 SDOH — ECONOMIC STABILITY: FOOD INSECURITY: WITHIN THE PAST 12 MONTHS, THE FOOD YOU BOUGHT JUST DIDN'T LAST AND YOU DIDN'T HAVE MONEY TO GET MORE.: PATIENT DECLINED

## 2024-08-01 SDOH — ECONOMIC STABILITY: TRANSPORTATION INSECURITY
IN THE PAST 12 MONTHS, HAS LACK OF TRANSPORTATION KEPT YOU FROM MEETINGS, WORK, OR FROM GETTING THINGS NEEDED FOR DAILY LIVING?: NO

## 2024-08-01 NOTE — PROGRESS NOTES
Yanni Torres, was evaluated through a synchronous (real-time) audio-video encounter. The patient (or guardian if applicable) is aware that this is a billable service, which includes applicable co-pays. This Virtual Visit was conducted with patient's (and/or legal guardian's) consent. Patient identification was verified, and a caregiver was present when appropriate.   The patient was located at Home: [address]  [facility] [address]  Provider was located at Facility (Appt Dept): [address] [place] [address]  [address]  [place] [address]  Confirm you are appropriately licensed, registered, or certified to deliver care in the state where the patient is located as indicated above. If you are not or unsure, please re-schedule the visit: Yes, I confirm.     Yanni Torres (:  1955) is a Established patient, presenting virtually for evaluation of the following:    Assessment & Plan   Below is the assessment and plan developed based on review of pertinent history, physical exam, labs, studies, and medications.  1. Elevated TSH  -     Thyroid Antibodies; Future  -     TSH + Free T4 Panel; Future  -     T3; Future  2. Urinary tract infection without hematuria, site unspecified    Return in about 2 months (around 10/1/2024) for discuss results of thyroid labs and medication. .       Subjective   HPI  Pt here to discuss labs. Pt also complains of fatigue and urinary burning. Macrobid prescribe and synthroid as well as TSH repeat T4 T3 and thyroid antibodies. Pt denies f/n/v/d cough congestion sore throat sob rash.   Review of Systems       Objective   Patient-Reported Vitals  No data recorded     Physical Exam         On this date 2024 I have spent 30 minutes reviewing previous notes, test results and face to face (virtual) with the patient discussing the diagnosis and importance of compliance with the treatment plan as well as documenting on the day of the visit.    --EDU Royal - NP

## 2024-08-01 NOTE — TELEPHONE ENCOUNTER
Talked with patient about her symptoms of dizziness, lethargy - positive for nitrates in her urine - pain in stomach and back - needs to discuss lab results with MONY Trinh NP and get treatment for her abnormal urine results - denies any burning or pain with urination  Angelina Watts LPN 8/1/2024 11:51 AM   [facility]

## 2024-08-01 NOTE — PATIENT INSTRUCTIONS
It was a pleasure to see you today.    1. Elevated TSH    2. Urinary tract infection without hematuria, site unspecified         No follow-ups on file.     Thank you for choosing [facility].    EDU Royal NP

## 2024-08-01 NOTE — TELEPHONE ENCOUNTER
Pt ([phone]) advised that she received the lab results and has been suffering for about 2 weeks. Labs show something in her urine. She would like to get an antibiotic or something for this infection.    Please send to:  [facility] [address]   P [phone]   F [phone]     Please assist with this matter.     VLT - PSR (Float Pool)

## 2024-08-08 ENCOUNTER — CLINICAL DOCUMENTATION (OUTPATIENT)
Age: 69
End: 2024-08-08

## 2024-08-08 ENCOUNTER — OFFICE VISIT (OUTPATIENT)
Age: 69
End: 2024-08-08
Payer: MEDICARE

## 2024-08-08 VITALS
BODY MASS INDEX: 24.04 KG/M2 | HEART RATE: 58 BPM | SYSTOLIC BLOOD PRESSURE: 133 MMHG | OXYGEN SATURATION: 98 % | DIASTOLIC BLOOD PRESSURE: 81 MMHG | WEIGHT: 153.2 LBS | HEIGHT: 67 IN | TEMPERATURE: 97.5 F

## 2024-08-08 DIAGNOSIS — Z85.3 HX OF BREAST CANCER: Primary | ICD-10-CM

## 2024-08-08 DIAGNOSIS — Z12.31 ENCOUNTER FOR SCREENING MAMMOGRAM FOR MALIGNANT NEOPLASM OF BREAST: ICD-10-CM

## 2024-08-08 PROCEDURE — 99203 OFFICE O/P NEW LOW 30 MIN: CPT | Performed by: INTERNAL MEDICINE

## 2024-08-08 NOTE — PROGRESS NOTES
Yanni Torres is a 69 y.o. female here for new patient appt for history of breast cancer diagnosed 2011.  Referred by Mary Lopez  Last mammogram and bone density done in 12/2023.  Taking Macrobid for UTI.  Wants oncologist in [facility]. No concerns today.    1. Have you been to the ER, urgent care clinic since your last visit?  Hospitalized since your last visit? New Pt    2. Have you seen or consulted any other health care providers outside of the [facility] since your last visit?  Include any pap smears or colon screening.  New Pt  
MG CAPS Take 1 capsule by mouth at bedtime   Yes Julius Alcaraz MD   vitamin B-12 (CYANOCOBALAMIN) 1000 MCG tablet Take 0.5 tablets by mouth at bedtime   Yes Julius Alcaraz MD   Cholecalciferol (VITAMIN D3) 125 MCG (5000 UT) TABS Take 1 tablet by mouth at bedtime   Yes Julius Alcaraz MD   TURMERIC PO Take 500 mg by mouth at bedtime   Yes Julius Alcaraz MD              Allergies   Allergen Reactions    Penicillins Hives    Tetanus Toxoids Hives           Objective:     Vitals:    08/08/24 1358   BP: 133/81   Site: Left Upper Arm   Position: Sitting   Pulse: 58   Temp: 97.5 °F (36.4 °C)   SpO2: 98%   Weight: 69.5 kg (153 lb 3.2 oz)   Height: 1.702 m (5' 7\")        PHYSICAL EXAM:  GENERAL: alert, cooperative, no distress, appears stated age  EYE: conjunctivae/corneas clear  LYMPHATIC: Cervical, supraclavicular, and axillary nodes normal.   THROAT & NECK: normal and no erythema or exudates noted.   LUNG: clear to auscultation bilaterally  HEART: regular rate and rhythm  ABDOMEN: soft, non-tender, non-distended.   EXTREMITIES:  extremities normal, atraumatic, no cyanosis or edema  SKIN: Normal.  NEUROLOGIC: AOx3. Gait normal. No gross motor/sensory deficit.          Assessment:     History of malignant neoplasm of upper-outer quadrant of right breast in female, estrogen receptor positive (CMS/HCC)  Staging form: Breast, AJCC V7  - Clinical: Stage IIA (T2, N0, M0)         Pathology:  ER positive, MI positive , HER2 negative, Oncotype HIGH risk     Treatment history    6/13/2011 Surgery     (date unknown) S/P RIGHT mastectomy T2N0M0 grade 3 IDC ER+ MI+ Her2 negative IHC, Oncotype HIGH RISK       - 12/16/2011 Chemotherapy     S/P Taxotere, Adriamycin, Cytoxan x 6 cycles      1/13/2012 - 4/13/2012 Hormone Therapy     S/P anastrozole, discontinued due to arthralgias      4/13/2012 - 6/15/2012 Hormone Therapy     S/P aromasin, discontinued due to arthralgias      7/2012 - 6/2021 Hormone Therapy

## 2024-08-08 NOTE — PROGRESS NOTES
[facility]   Psychosocial Assessment    [x] Med-Onc [facility] [] Med-Onc [facility] [] Med-Onc [facility] [] Rad-Onc [facility] [] Rad-Onc [facility] [] Rad-Onc [facility] [] Rad-Onc [facility] [] [facility] Center       Patient: Yanni Torres    Reason for Assessment:    [] Social Work Referral  [x] Initial Assessment  [x] New Diagnosis  [] Other:     Advance Care Planning:  [] AMD Discussed and Completed  [] AMD Reviewed Verbally [] AMD Copy Provided  [x] Conversation Deferred [] Conversation Declined      Sources of Information:    [x] Patient  [] Family  [] Staff  [] Medical Record    Mental Status:    [] Alert  [] Lethargic  [] Unresponsive  [] Unable to assess   Oriented to: [] Person  [] Place  [] Time  [] Situation      Relationship Status:  [] Single  []   [] Significant Other/Life Partner  []   []   []     Living Circumstances:  [] Lives Alone  [x] Family/Significant Other in Household  [] Roommates  [] Children in the Home  [] Paid Caregivers  [] Assisted Living Facility/Group Home  [] Skilled Nursing Facility  [] Homeless  [] Incarcerated  [] Environmental/Care Concerns  [] Other:    Employment Status:   [] Employed Full-time  [] Employed Part-time  [] Homemaker  [x] Retired  [] Short-Term Disability  [] Long-Term Disability  [] Unemployed  [] SSI  [] SSDI  [] Self-Employment    Transportation Resources:   [x] Self  [] Family/Friends  [] Insurance  [] Community Programs  [] Other:    Barriers to Learning:    [] Language  [] Developmental  [] Cognitive  [] Altered Mental Status  [] Visual/Hearing Impairment  [] Unable to Read/Write  [] Motivational  [] Challenges Understanding Medical Jargon [x] No Barriers Identified      Financial/Legal Concerns:    [] Uninsured  [] Limited Income/Resources  [] Non-Citizen  [] Food Insecurity [x] No Concerns Identified   [] Other:    Christianity/Spiritual/Existential:   Does patient have any spiritual or Adventism beliefs? [x] Yes [] No  Is the patient involved in a

## 2024-08-08 NOTE — PROGRESS NOTES
NCCN Distress Thermometer    Date Screening Completed: 8/8/24    Screening Declined:  [] Yes    Number that best describes how much distress you've experienced in the past week, including today?  0 [] - No distress 1 []      2 []      3 []      4 []       5 []       6 []      7 []      8 []      9 []       10 [] - Extreme distress    PROBLEM LIST  Have you had concerns about any of the items below in the past week, including today?      Physical Concerns Practical Concerns   [] Pain [] Taking care of myself    [] Sleep [] Taking care of others    [] Fatigue [] Work   [] Tobacco use  [] School   [] Substance use  [] Housing   [] Memory or concentration [] Finances   [] Sexual health [] Insurance   [] Changes in eating  [] Transportation   [] Loss or change of physical abilities  []     [] Having enough food   Emotional Concerns [] Access to medicine   [] Worry or anxiety [] Treatment decisions   [] Sadness or depression    [] Loss of interest or enjoyment  Spiritual or Episcopalian Concerns   [] Grief or loss  [] Sense of meaning or purpose   [] Fear [] Changes in anum or beliefs   [] Loneliness  [] Death, dying, or afterlife   [] Anger [] Conflict between beliefs and cancer treatments    [] Changes in appearance [] Relationship with the sacred   [] Feelings of worthlessness or being a burden [] Ritual or dietary needs        Social Concerns     [] Relationship with spouse or partner     [] Relationship with children    [] Relationship with family members     [] Relationship with friends or coworkers     [] Communication with health care team     [] Ability to have children     [] Prejudice or discrimination        Other Concerns:     Patient received resource information and education:  [] Yes  [] No

## 2024-09-11 RX ORDER — LEVOTHYROXINE SODIUM 25 UG/1
25 TABLET ORAL DAILY
Qty: 90 TABLET | Refills: 0 | Status: SHIPPED | OUTPATIENT
Start: 2024-09-11

## 2024-09-19 LAB
T3 SERPL-MCNC: 107 NG/DL (ref 71–180)
T4 FREE SERPL-MCNC: 1.21 NG/DL (ref 0.82–1.77)
THYROGLOB AB SERPL-ACNC: <1 IU/ML (ref 0–0.9)
THYROPEROXIDASE AB SERPL-ACNC: 13 IU/ML (ref 0–34)
TSH SERPL DL<=0.005 MIU/L-ACNC: 2.62 UIU/ML (ref 0.45–4.5)

## 2024-10-02 ENCOUNTER — OFFICE VISIT (OUTPATIENT)
Facility: CLINIC | Age: 69
End: 2024-10-02
Payer: MEDICARE

## 2024-10-02 VITALS
HEIGHT: 67 IN | TEMPERATURE: 97.3 F | HEART RATE: 85 BPM | SYSTOLIC BLOOD PRESSURE: 118 MMHG | DIASTOLIC BLOOD PRESSURE: 74 MMHG | OXYGEN SATURATION: 97 % | RESPIRATION RATE: 16 BRPM | BODY MASS INDEX: 24.01 KG/M2 | WEIGHT: 153 LBS

## 2024-10-02 DIAGNOSIS — R73.03 PREDIABETES: ICD-10-CM

## 2024-10-02 DIAGNOSIS — I10 PRIMARY HYPERTENSION: ICD-10-CM

## 2024-10-02 DIAGNOSIS — Z00.00 MEDICARE ANNUAL WELLNESS VISIT, SUBSEQUENT: Primary | ICD-10-CM

## 2024-10-02 DIAGNOSIS — E78.5 HYPERLIPIDEMIA, UNSPECIFIED HYPERLIPIDEMIA TYPE: ICD-10-CM

## 2024-10-02 DIAGNOSIS — E03.9 HYPOTHYROIDISM, UNSPECIFIED TYPE: ICD-10-CM

## 2024-10-02 PROCEDURE — 3078F DIAST BP <80 MM HG: CPT | Performed by: NURSE PRACTITIONER

## 2024-10-02 PROCEDURE — G0439 PPPS, SUBSEQ VISIT: HCPCS | Performed by: NURSE PRACTITIONER

## 2024-10-02 PROCEDURE — 3017F COLORECTAL CA SCREEN DOC REV: CPT | Performed by: NURSE PRACTITIONER

## 2024-10-02 PROCEDURE — 1123F ACP DISCUSS/DSCN MKR DOCD: CPT | Performed by: NURSE PRACTITIONER

## 2024-10-02 PROCEDURE — G8484 FLU IMMUNIZE NO ADMIN: HCPCS | Performed by: NURSE PRACTITIONER

## 2024-10-02 PROCEDURE — 3074F SYST BP LT 130 MM HG: CPT | Performed by: NURSE PRACTITIONER

## 2024-10-02 RX ORDER — EZETIMIBE 10 MG/1
10 TABLET ORAL DAILY
Qty: 90 TABLET | Refills: 1 | Status: SHIPPED | OUTPATIENT
Start: 2024-10-02

## 2024-10-02 ASSESSMENT — PATIENT HEALTH QUESTIONNAIRE - PHQ9
SUM OF ALL RESPONSES TO PHQ QUESTIONS 1-9: 0
2. FEELING DOWN, DEPRESSED OR HOPELESS: NOT AT ALL
SUM OF ALL RESPONSES TO PHQ QUESTIONS 1-9: 0
1. LITTLE INTEREST OR PLEASURE IN DOING THINGS: NOT AT ALL
SUM OF ALL RESPONSES TO PHQ9 QUESTIONS 1 & 2: 0
SUM OF ALL RESPONSES TO PHQ QUESTIONS 1-9: 0
SUM OF ALL RESPONSES TO PHQ QUESTIONS 1-9: 0

## 2024-10-02 NOTE — PATIENT INSTRUCTIONS
It was a pleasure to see you today.    1. Medicare annual wellness visit, subsequent    2. Prediabetes    3. Hyperlipidemia, unspecified hyperlipidemia type    4. Hypothyroidism, unspecified type    5. Primary hypertension       Have cholesterol drawn just prior to next appt   Return in about 6 months (around 4/2/2025) for cholesterol check, diabetes check, blood pressure.     Thank you for choosing [facility].    Mary Lopez, APRN - NP        A Healthy Heart: Care Instructions  Overview     Coronary artery disease, also called heart disease, occurs when a substance called plaque builds up in the vessels that supply oxygen-rich blood to your heart muscle. This can narrow the blood vessels and reduce blood flow. A heart attack happens when blood flow is completely blocked. A high-fat diet, smoking, and other factors increase the risk of heart disease.  Your doctor has found that you have a chance of having heart disease. A heart-healthy lifestyle can help keep your heart healthy and prevent heart disease. This lifestyle includes eating healthy, being active, staying at a weight that's healthy for you, and not smoking or using tobacco. It also includes taking medicines as directed, managing other health conditions, and trying to get a healthy amount of sleep.  Follow-up care is a key part of your treatment and safety. Be sure to make and go to all appointments, and call your doctor if you are having problems. It's also a good idea to know your test results and keep a list of the medicines you take.  How can you care for yourself at home?  Diet    Use less salt when you cook and eat. This helps lower your blood pressure. Taste food before salting. Add only a little salt when you think you need it. With time, your taste buds will adjust to less salt.     Eat fewer snack items, fast foods, canned soups, and other high-salt, high-fat, processed foods.     Read food labels and try to avoid

## 2024-10-02 NOTE — PROGRESS NOTES
Chief Complaint   Patient presents with    Thyroid Problem     Discuss labs - follow up    Medicare AWV       
normal S1 and S2, no gallops, intact distal pulses, and no carotid bruits  Extremities: no edema            Allergies   Allergen Reactions    Penicillins Hives    Tetanus Toxoids Hives     Prior to Visit Medications    Medication Sig Taking? Authorizing Provider   Calcium Carb-Cholecalciferol 600-12.5 MG-MCG CAPS  Yes Julius Alcaraz MD   ezetimibe (ZETIA) 10 MG tablet Take 1 tablet by mouth daily Yes Mary Lopez APRN - NP   levothyroxine (SYNTHROID) 25 MCG tablet Take 1 tablet by mouth Daily Yes Mary Lopez APRN - NP   MYRBETRIQ 50 MG TB24 Take 50 mg by mouth daily Yes Mary Lopez APRN - NP   triamterene-hydroCHLOROthiazide (MAXZIDE-25) 37.5-25 MG per tablet Take 1 tablet by mouth daily Yes Mary Lopez APRN - NP   calcium carbonate 600 MG TABS tablet Take 1 tablet by mouth 2 times daily Yes Julius Alcaraz MD   Cholecalciferol (VITAMIN D3) 20 MCG (800 UNIT) TABS Take 20 mcg by mouth 2 times daily Yes Julius Alcaraz MD   ascorbic acid (QC VITAMIN C WITH ABRAHAN HIPS) 500 MG tablet Take 2 tablets by mouth daily Abrahan hip 8 mg Yes Julius Alcaraz MD   vitamin B-6 (PYRIDOXINE) 100 MG tablet Take 1 tablet by mouth daily Yes Julius Alcaraz MD   zinc 50 MG TABS tablet Take 1 tablet by mouth daily Yes Julius Alcaraz MD Ginger Zingiber officinalis, (LARRY ROOT) 550 MG CAPS Take 1 capsule by mouth at bedtime Yes Julius Alcaraz MD   TURMERIC PO Take 500 mg by mouth at bedtime Yes Provider, Historical, MD       CareTeam (Including outside providers/suppliers regularly involved in providing care):   Patient Care Team:  Mary Lopez APRN - NP as PCP - General (Family Medicine)  Mary Lopez APRN - NP as PCP - Empaneled Provider  Greg Watson MD (General Surgery)      Reviewed and updated this visit:  Tobacco  Allergies  Med Hx  Surg Hx  Soc Hx  Fam Hx

## 2024-11-18 RX ORDER — MIRABEGRON 50 MG/1
50 TABLET, FILM COATED, EXTENDED RELEASE ORAL DAILY
Qty: 90 TABLET | Refills: 3 | Status: SHIPPED | OUTPATIENT
Start: 2024-11-18

## 2024-11-18 RX ORDER — TRIAMTERENE AND HYDROCHLOROTHIAZIDE 37.5; 25 MG/1; MG/1
1 TABLET ORAL DAILY
Qty: 90 TABLET | Refills: 3 | Status: SHIPPED | OUTPATIENT
Start: 2024-11-18

## 2024-11-19 RX ORDER — LEVOTHYROXINE SODIUM 25 UG/1
25 TABLET ORAL DAILY
Qty: 90 TABLET | Refills: 3 | Status: SHIPPED | OUTPATIENT
Start: 2024-11-19

## 2024-12-30 ENCOUNTER — HOSPITAL ENCOUNTER (OUTPATIENT)
Facility: HOSPITAL | Age: 69
Discharge: HOME | End: 2025-01-02
Attending: INTERNAL MEDICINE
Payer: MEDICARE

## 2024-12-30 VITALS — BODY MASS INDEX: 24.01 KG/M2 | WEIGHT: 153 LBS | HEIGHT: 67 IN

## 2024-12-30 DIAGNOSIS — Z85.3 HX OF BREAST CANCER: ICD-10-CM

## 2024-12-30 DIAGNOSIS — Z12.31 ENCOUNTER FOR SCREENING MAMMOGRAM FOR MALIGNANT NEOPLASM OF BREAST: ICD-10-CM

## 2024-12-30 PROCEDURE — 77063 BREAST TOMOSYNTHESIS BI: CPT

## 2024-12-30 PROCEDURE — 77067 SCR MAMMO BI INCL CAD: CPT

## 2025-01-10 LAB
ALBUMIN SERPL-MCNC: 4.1 G/DL (ref 3.9–4.9)
ALP SERPL-CCNC: 86 IU/L (ref 44–121)
ALT SERPL-CCNC: 13 IU/L (ref 0–32)
APPEARANCE UR: CLEAR
AST SERPL-CCNC: 20 IU/L (ref 0–40)
BACTERIA #/AREA URNS HPF: ABNORMAL /[HPF]
BASOPHILS # BLD AUTO: 0.1 X10E3/UL (ref 0–0.2)
BASOPHILS NFR BLD AUTO: 1 %
BILIRUB SERPL-MCNC: 0.4 MG/DL (ref 0–1.2)
BILIRUB UR QL STRIP: NEGATIVE
BUN SERPL-MCNC: 14 MG/DL (ref 8–27)
BUN/CREAT SERPL: 15 (ref 12–28)
CALCIUM SERPL-MCNC: 9.8 MG/DL (ref 8.7–10.3)
CASTS URNS QL MICRO: ABNORMAL /LPF
CHLORIDE SERPL-SCNC: 101 MMOL/L (ref 96–106)
CHOLEST SERPL-MCNC: 237 MG/DL (ref 100–199)
CO2 SERPL-SCNC: 27 MMOL/L (ref 20–29)
COLOR UR: YELLOW
CREAT SERPL-MCNC: 0.91 MG/DL (ref 0.57–1)
EGFRCR SERPLBLD CKD-EPI 2021: 69 ML/MIN/1.73
EOSINOPHIL # BLD AUTO: 0.1 X10E3/UL (ref 0–0.4)
EOSINOPHIL NFR BLD AUTO: 2 %
EPI CELLS #/AREA URNS HPF: >10 /HPF (ref 0–10)
ERYTHROCYTE [DISTWIDTH] IN BLOOD BY AUTOMATED COUNT: 13.1 % (ref 11.7–15.4)
GLOBULIN SER CALC-MCNC: 2.4 G/DL (ref 1.5–4.5)
GLUCOSE SERPL-MCNC: 96 MG/DL (ref 70–99)
GLUCOSE UR QL STRIP: NEGATIVE
HBA1C MFR BLD: 5.8 % (ref 4.8–5.6)
HCT VFR BLD AUTO: 42.4 % (ref 34–46.6)
HDLC SERPL-MCNC: 66 MG/DL
HGB BLD-MCNC: 14.2 G/DL (ref 11.1–15.9)
HGB UR QL STRIP: NEGATIVE
IMM GRANULOCYTES # BLD AUTO: 0 X10E3/UL (ref 0–0.1)
IMM GRANULOCYTES NFR BLD AUTO: 0 %
IMP & REVIEW OF LAB RESULTS: NORMAL
KETONES UR QL STRIP: NEGATIVE
LDLC SERPL CALC-MCNC: 154 MG/DL (ref 0–99)
LEUKOCYTE ESTERASE UR QL STRIP: ABNORMAL
LYMPHOCYTES # BLD AUTO: 2.5 X10E3/UL (ref 0.7–3.1)
LYMPHOCYTES NFR BLD AUTO: 51 %
MCH RBC QN AUTO: 31.2 PG (ref 26.6–33)
MCHC RBC AUTO-ENTMCNC: 33.5 G/DL (ref 31.5–35.7)
MCV RBC AUTO: 93 FL (ref 79–97)
MICRO URNS: ABNORMAL
MONOCYTES # BLD AUTO: 0.3 X10E3/UL (ref 0.1–0.9)
MONOCYTES NFR BLD AUTO: 7 %
NEUTROPHILS # BLD AUTO: 1.9 X10E3/UL (ref 1.4–7)
NEUTROPHILS NFR BLD AUTO: 39 %
NITRITE UR QL STRIP: NEGATIVE
PH UR STRIP: 6 [PH] (ref 5–7.5)
PLATELET # BLD AUTO: 309 X10E3/UL (ref 150–450)
POTASSIUM SERPL-SCNC: 3.8 MMOL/L (ref 3.5–5.2)
PROT SERPL-MCNC: 6.5 G/DL (ref 6–8.5)
PROT UR QL STRIP: NEGATIVE
RBC # BLD AUTO: 4.55 X10E6/UL (ref 3.77–5.28)
RBC #/AREA URNS HPF: ABNORMAL /HPF (ref 0–2)
SODIUM SERPL-SCNC: 140 MMOL/L (ref 134–144)
SP GR UR STRIP: 1.01 (ref 1–1.03)
SPECIMEN STATUS REPORT: NORMAL
TRIGL SERPL-MCNC: 96 MG/DL (ref 0–149)
TSH SERPL DL<=0.005 MIU/L-ACNC: 4.87 UIU/ML (ref 0.45–4.5)
UROBILINOGEN UR STRIP-MCNC: 0.2 MG/DL (ref 0.2–1)
VLDLC SERPL CALC-MCNC: 17 MG/DL (ref 5–40)
WBC # BLD AUTO: 4.8 X10E3/UL (ref 3.4–10.8)
WBC #/AREA URNS HPF: ABNORMAL /HPF (ref 0–5)

## 2025-01-13 DIAGNOSIS — E03.9 HYPOTHYROIDISM, UNSPECIFIED TYPE: Primary | ICD-10-CM

## 2025-01-15 ENCOUNTER — TELEPHONE (OUTPATIENT)
Facility: CLINIC | Age: 70
End: 2025-01-15

## 2025-01-15 DIAGNOSIS — E03.9 HYPOTHYROIDISM, UNSPECIFIED TYPE: Primary | ICD-10-CM

## 2025-01-15 RX ORDER — SULFAMETHOXAZOLE AND TRIMETHOPRIM 800; 160 MG/1; MG/1
1 TABLET ORAL 2 TIMES DAILY
Qty: 10 TABLET | Refills: 0 | Status: SHIPPED | OUTPATIENT
Start: 2025-01-15 | End: 2025-01-20

## 2025-01-15 NOTE — TELEPHONE ENCOUNTER
Spoke with patient. Pt starting Levothyroxine again 25 mcg. Will recheck in 4-6 weeks tsh. UTI Bactrim ordered.

## 2025-01-22 RX ORDER — NAPROXEN 500 MG/1
500 TABLET ORAL 2 TIMES DAILY WITH MEALS
COMMUNITY
Start: 2024-02-19 | End: 2025-01-22 | Stop reason: SDUPTHER

## 2025-01-23 RX ORDER — LEVOTHYROXINE SODIUM 25 UG/1
25 TABLET ORAL DAILY
Qty: 90 TABLET | Refills: 1 | Status: SHIPPED | OUTPATIENT
Start: 2025-01-23

## 2025-01-23 RX ORDER — EZETIMIBE 10 MG/1
10 TABLET ORAL DAILY
Qty: 90 TABLET | Refills: 1 | Status: SHIPPED | OUTPATIENT
Start: 2025-01-23

## 2025-01-23 RX ORDER — TRIAMTERENE AND HYDROCHLOROTHIAZIDE 37.5; 25 MG/1; MG/1
1 TABLET ORAL DAILY
Qty: 90 TABLET | Refills: 1 | Status: SHIPPED | OUTPATIENT
Start: 2025-01-23

## 2025-01-23 RX ORDER — NAPROXEN 500 MG/1
500 TABLET ORAL 2 TIMES DAILY WITH MEALS
Qty: 60 TABLET | Refills: 1 | Status: SHIPPED | OUTPATIENT
Start: 2025-01-23

## 2025-03-28 ENCOUNTER — RESULTS FOLLOW-UP (OUTPATIENT)
Facility: CLINIC | Age: 70
End: 2025-03-28

## 2025-03-28 LAB
T4 FREE SERPL-MCNC: 1.22 NG/DL (ref 0.82–1.77)
TSH SERPL DL<=0.005 MIU/L-ACNC: 4.36 UIU/ML (ref 0.45–4.5)

## 2025-04-01 SDOH — ECONOMIC STABILITY: INCOME INSECURITY: IN THE LAST 12 MONTHS, WAS THERE A TIME WHEN YOU WERE NOT ABLE TO PAY THE MORTGAGE OR RENT ON TIME?: NO

## 2025-04-01 SDOH — ECONOMIC STABILITY: FOOD INSECURITY: WITHIN THE PAST 12 MONTHS, YOU WORRIED THAT YOUR FOOD WOULD RUN OUT BEFORE YOU GOT MONEY TO BUY MORE.: NEVER TRUE

## 2025-04-01 SDOH — ECONOMIC STABILITY: TRANSPORTATION INSECURITY
IN THE PAST 12 MONTHS, HAS THE LACK OF TRANSPORTATION KEPT YOU FROM MEDICAL APPOINTMENTS OR FROM GETTING MEDICATIONS?: NO

## 2025-04-01 SDOH — ECONOMIC STABILITY: FOOD INSECURITY: WITHIN THE PAST 12 MONTHS, THE FOOD YOU BOUGHT JUST DIDN'T LAST AND YOU DIDN'T HAVE MONEY TO GET MORE.: NEVER TRUE

## 2025-04-02 ENCOUNTER — OFFICE VISIT (OUTPATIENT)
Facility: CLINIC | Age: 70
End: 2025-04-02

## 2025-04-02 VITALS
HEIGHT: 67 IN | DIASTOLIC BLOOD PRESSURE: 83 MMHG | HEART RATE: 80 BPM | WEIGHT: 154 LBS | SYSTOLIC BLOOD PRESSURE: 136 MMHG | BODY MASS INDEX: 24.17 KG/M2 | RESPIRATION RATE: 16 BRPM | TEMPERATURE: 97.2 F | OXYGEN SATURATION: 97 %

## 2025-04-02 DIAGNOSIS — R73.03 PREDIABETES: ICD-10-CM

## 2025-04-02 DIAGNOSIS — M81.0 OSTEOPOROSIS, UNSPECIFIED OSTEOPOROSIS TYPE, UNSPECIFIED PATHOLOGICAL FRACTURE PRESENCE: ICD-10-CM

## 2025-04-02 DIAGNOSIS — N39.0 URINARY TRACT INFECTION WITHOUT HEMATURIA, SITE UNSPECIFIED: Primary | ICD-10-CM

## 2025-04-02 DIAGNOSIS — I10 PRIMARY HYPERTENSION: ICD-10-CM

## 2025-04-02 DIAGNOSIS — E78.5 HYPERLIPIDEMIA, UNSPECIFIED HYPERLIPIDEMIA TYPE: ICD-10-CM

## 2025-04-02 LAB
BILIRUBIN, URINE, POC: NEGATIVE
BLOOD URINE, POC: ABNORMAL
GLUCOSE URINE, POC: NEGATIVE
HBA1C MFR BLD: 5.6 %
KETONES, URINE, POC: NEGATIVE
LEUKOCYTE ESTERASE, URINE, POC: ABNORMAL
NITRITE, URINE, POC: POSITIVE
PH, URINE, POC: 6 (ref 4.6–8)
PROTEIN,URINE, POC: NEGATIVE
SPECIFIC GRAVITY, URINE, POC: 1.01 (ref 1–1.03)
URINALYSIS CLARITY, POC: ABNORMAL
URINALYSIS COLOR, POC: YELLOW
UROBILINOGEN, POC: ABNORMAL

## 2025-04-02 RX ORDER — SULFAMETHOXAZOLE AND TRIMETHOPRIM 800; 160 MG/1; MG/1
1 TABLET ORAL 2 TIMES DAILY
Qty: 10 TABLET | Refills: 0 | Status: SHIPPED | OUTPATIENT
Start: 2025-04-02 | End: 2025-04-07

## 2025-04-02 ASSESSMENT — ENCOUNTER SYMPTOMS
ABDOMINAL PAIN: 0
WHEEZING: 0
NAUSEA: 0
CHEST TIGHTNESS: 0
CONSTIPATION: 0
SHORTNESS OF BREATH: 0
DIARRHEA: 0
COUGH: 0

## 2025-04-02 ASSESSMENT — PATIENT HEALTH QUESTIONNAIRE - PHQ9
1. LITTLE INTEREST OR PLEASURE IN DOING THINGS: NOT AT ALL
2. FEELING DOWN, DEPRESSED OR HOPELESS: NOT AT ALL
SUM OF ALL RESPONSES TO PHQ QUESTIONS 1-9: 0

## 2025-04-02 NOTE — PROGRESS NOTES
Chief Complaint   Patient presents with    Thyroid Problem     6 month follow up     Urinary Burning     Odiferous and brownish vaginal discharge

## 2025-04-02 NOTE — PATIENT INSTRUCTIONS
It was a pleasure to see you today.    1. Urinary tract infection without hematuria, site unspecified    2. Prediabetes    3. Hyperlipidemia, unspecified hyperlipidemia type    4. Osteoporosis, unspecified osteoporosis type, unspecified pathological fracture presence    5. Primary hypertension         Return in about 5 months (around 9/2/2025).     Thank you for choosing [facility].    EDU Royal NP It was a pleasure to see you today.    1. Urinary tract infection without hematuria, site unspecified    2. Prediabetes    3. Hyperlipidemia, unspecified hyperlipidemia type    4. Osteoporosis, unspecified osteoporosis type, unspecified pathological fracture presence    5. Primary hypertension         Return in about 5 months (around 9/2/2025).     Thank you for choosing [facility].    EDU Royal NP

## 2025-04-02 NOTE — PROGRESS NOTES
Yanni Torres (:  1955) is a 70 y.o. female who presents to the office today for the following:  Thyroid Problem (6 month follow up ) and Urinary Burning (Odiferous and brownish vaginal discharge)      Assessment & Plan   ASSESSMENT/PLAN:  1. Urinary tract infection without hematuria, site unspecified  -     AMB POC URINALYSIS DIP STICK AUTO W/O MICRO  -     Culture, Urine; Future  2. Prediabetes  -     AMB POC HEMOGLOBIN A1C  3. Hyperlipidemia, unspecified hyperlipidemia type  -     Lipid Panel; Future  4. Osteoporosis, unspecified osteoporosis type, unspecified pathological fracture presence  5. Primary hypertension      No results found for any visits on 25.     Return in about 5 months (around 2025).         Subjective   SUBJECTIVE/OBJECTIVE:  Thyroid Problem  Patient reports no anxiety, constipation, diarrhea, fatigue or palpitations.     Pt presents to the clinic for recheck of thyroid A1c and cholesterol. Pt is euthyroid now. A1c 5.6. Pt states stopped her Zetia due to muscle aches again. Will recheck lipids and consider next steps. Pt taking Calcium for her osteoporosis. Pt only complaint for past month worsening burning with urination and foul smell. UA poc pos for nitrites and leukocytes No blood. Macrobid prescribed.     Allergies   Allergen Reactions    Penicillins Hives    Tetanus Toxoids Hives     Current Outpatient Medications   Medication Sig Dispense Refill    sulfamethoxazole-trimethoprim (BACTRIM DS;SEPTRA DS) 800-160 MG per tablet Take 1 tablet by mouth 2 times daily for 5 days 10 tablet 0    triamterene-hydroCHLOROthiazide (MAXZIDE-25) 37.5-25 MG per tablet Take 1 tablet by mouth daily 90 tablet 1    levothyroxine (SYNTHROID) 25 MCG tablet Take 1 tablet by mouth daily 90 tablet 1    naproxen (NAPROSYN) 500 MG tablet Take 1 tablet by mouth 2 times daily (with meals) 60 tablet 1    MYRBETRIQ 50 MG TB24 TAKE 1 TABLET EVERY DAY 90 tablet 3    Calcium Carb-Cholecalciferol 600-12.5

## 2025-04-05 LAB — BACTERIA UR CULT: ABNORMAL

## 2025-04-07 ENCOUNTER — RESULTS FOLLOW-UP (OUTPATIENT)
Facility: CLINIC | Age: 70
End: 2025-04-07

## 2025-04-08 LAB — BACTERIA UR CULT: ABNORMAL

## 2025-06-16 ENCOUNTER — CLINICAL DOCUMENTATION (OUTPATIENT)
Facility: CLINIC | Age: 70
End: 2025-06-16

## 2025-06-17 ENCOUNTER — TELEPHONE (OUTPATIENT)
Facility: CLINIC | Age: 70
End: 2025-06-17

## 2025-06-24 RX ORDER — NAPROXEN 500 MG/1
500 TABLET ORAL 2 TIMES DAILY WITH MEALS
Qty: 60 TABLET | Refills: 1 | Status: SHIPPED | OUTPATIENT
Start: 2025-06-24

## 2025-06-24 RX ORDER — LEVOTHYROXINE SODIUM 25 UG/1
25 TABLET ORAL DAILY
Qty: 90 TABLET | Refills: 1 | Status: SHIPPED | OUTPATIENT
Start: 2025-06-24

## 2025-06-24 RX ORDER — TRIAMTERENE AND HYDROCHLOROTHIAZIDE 37.5; 25 MG/1; MG/1
1 TABLET ORAL DAILY
Qty: 90 TABLET | Refills: 1 | Status: SHIPPED | OUTPATIENT
Start: 2025-06-24

## (undated) DEVICE — GENERAL LAPAROSCOPY-MRMC: Brand: MEDLINE INDUSTRIES, INC.

## (undated) DEVICE — GLOVE SURG SZ 7 CRM LTX FREE POLYISOPRENE POLYMER BEAD ANTI

## (undated) DEVICE — SET ADMIN 16ML TBNG L100IN 2 Y INJ SITE IV PIGGY BK DISP (ORDER IN MULIPLES OF 48)

## (undated) DEVICE — OPEN-END URETERAL CATHETER: Brand: COOK

## (undated) DEVICE — COVER,MAYO STAND,XL,STERILE: Brand: MEDLINE

## (undated) DEVICE — SOLUTION IV 500ML 0.9% SOD CHL PH 5 INJ USP VIAFLX PLAS

## (undated) DEVICE — TROCAR: Brand: KII FIOS FIRST ENTRY

## (undated) DEVICE — BLADE CLIPPER GEN PURP NS

## (undated) DEVICE — LIQUIBAND RAPID ADHESIVE 36/CS 0.8ML: Brand: MEDLINE

## (undated) DEVICE — RETRIEVAL BALLOON CATHETER: Brand: EXTRACTOR™ PRO RX

## (undated) DEVICE — GLOVE SURG SZ 7 L12IN FNGR THK79MIL GRN LTX FREE

## (undated) DEVICE — SPONGE DRN W4XL4IN RAYON/POLYESTER 6 PLY NONWOVEN PRECUT 2 PER PK

## (undated) DEVICE — FORCEPS BX L240CM JAW DIA2.8MM L CAP W/ NDL MIC MESH TOOTH

## (undated) DEVICE — TROCAR: Brand: KII SLEEVE

## (undated) DEVICE — HYPODERMIC SAFETY NEEDLE: Brand: MAGELLAN

## (undated) DEVICE — 3M™ TEGADERM™ TRANSPARENT FILM DRESSING FRAME STYLE, 1624W, 2-3/8 IN X 2-3/4 IN (6 CM X 7 CM), 100/CT 4CT/CASE: Brand: 3M™ TEGADERM™

## (undated) DEVICE — CATHETER URET 4FR L70CM POLYUR OLV FLX TIP KINK RESIST W/

## (undated) DEVICE — Device

## (undated) DEVICE — SYRINGE 20ML LL S/C 50

## (undated) DEVICE — SOLUTION IRRIG 1000ML 0.9% SOD CHL USP POUR PLAS BTL

## (undated) DEVICE — 1200 GUARD II KIT W/5MM TUBE W/O VAC TUBE: Brand: GUARDIAN

## (undated) DEVICE — YANKAUER,TAPERED BULBOUS TIP,W/O VENT: Brand: MEDLINE

## (undated) DEVICE — GUIDEWIRE ENDOSCP WRK L450CM DIA0.035IN STD SHFT STR RND

## (undated) DEVICE — NEEDLE INSUFFLATION 14 GAX120 MM SURGINEEDLE

## (undated) DEVICE — SET TUBE INSUFFLATION HI FLOW PNEUMOCLEAR

## (undated) DEVICE — STRIP,CLOSURE,WOUND,MEDI-STRIP,1/2X4: Brand: MEDLINE

## (undated) DEVICE — SUTURE VCRL SZ 1 L36IN ABSRB UD L36MM CT-1 1/2 CIR J947H

## (undated) DEVICE — CANNULATING SPHINCTEROTOME: Brand: JAGTOME™ REVOLUTION RX

## (undated) DEVICE — SYRINGE MED 10ML LUERLOCK TIP W/O SFTY DISP

## (undated) DEVICE — SPONGE GZ W4XL4IN COT 12 PLY TYP VII WVN C FLD DSGN STERILE

## (undated) DEVICE — KIT,1200CC CANISTER,3/16"X6' TUBING: Brand: MEDLINE INDUSTRIES, INC.

## (undated) DEVICE — SOLIDIFIER FLD 2OZ 1500CC N DISINF IN BTL DISP SAFESORB

## (undated) DEVICE — 3M™ IOBAN™ 2 ANTIMICROBIAL INCISE DRAPE 6650EZ: Brand: IOBAN™ 2

## (undated) DEVICE — LOOP LIG SUT SZ 0 L18IN ABSRB POLYDIOXANONE MFIL PDS II

## (undated) DEVICE — BLADE SURG NO15 C STL REUSE HNDL CONVENTIONAL DISP RIB BK

## (undated) DEVICE — SUTURE VCRL SZ 4-0 L27IN ABSRB UD L19MM PS-2 3/8 CIR PRIM J426H

## (undated) DEVICE — LINE FILTER NASAL CANNULA SHORT TERM ADULT 6.5

## (undated) DEVICE — GUIDEWIRE ENDOSCP STD ANG HYDRPHLC L260CM OD035IN NAVIPRO

## (undated) DEVICE — ELECTRODE,RADIOTRANSLUCENT,FOAM,5PK: Brand: MEDLINE

## (undated) DEVICE — CUFF BLD PRSS AD CLTH SGL TB W/ BAYNT CONN ROUNDED CORNER

## (undated) DEVICE — SOLUTION ANTIFOG VIS SYS CLEARIFY LAPSCP

## (undated) DEVICE — SYRINGE MED 20ML STD CLR PLAS LUERLOCK TIP N CTRL DISP

## (undated) DEVICE — ELECTRODE PT RET AD L9FT HI MOIST COND ADH HYDRGEL CORDED

## (undated) DEVICE — SYRINGE MEDICAL 3ML CLEAR PLASTIC STANDARD NON CONTROL LUERLOCK TIP DISPOSABLE

## (undated) DEVICE — SENSOR PLSE OXMTR NEO AD 40KG ADH DISP NELLCOR

## (undated) DEVICE — DECANTER BAG 9": Brand: MEDLINE INDUSTRIES, INC.

## (undated) DEVICE — CATHETER IV 20 GAX1 IN N WNG KINK RESIST INSYT AUTOGRD

## (undated) DEVICE — SOLUTION IV 1000ML 0.9% SOD CHL PH 5 INJ USP VIAFLX PLAS

## (undated) DEVICE — C-ARM: Brand: UNBRANDED

## (undated) DEVICE — BASIN EMSIS 16OZ GRAPHITE PLAS KID SHP MOLD GRAD FOR ORAL

## (undated) DEVICE — APPLIER CLP M/L SHFT DIA5MM 15 LIG LIGAMAX 5

## (undated) DEVICE — GUIDEWIRE VASC L260CM TIP L5CM 0.25FR STR RND TIP TUNGSTEN

## (undated) DEVICE — IV STRT KT 3282] LSL INDUSTRIES INC]

## (undated) DEVICE — SUTURE ETHLN SZ 2-0 L18IN NONABSORBABLE BLK L19MM PS-2 PRIM 593H